# Patient Record
Sex: FEMALE | Race: BLACK OR AFRICAN AMERICAN | Employment: FULL TIME | URBAN - METROPOLITAN AREA
[De-identification: names, ages, dates, MRNs, and addresses within clinical notes are randomized per-mention and may not be internally consistent; named-entity substitution may affect disease eponyms.]

---

## 2017-01-21 ENCOUNTER — HOSPITAL ENCOUNTER (EMERGENCY)
Age: 52
Discharge: HOME | End: 2017-01-21
Attending: EMERGENCY MEDICINE
Payer: COMMERCIAL

## 2017-01-21 VITALS
WEIGHT: 285 LBS | HEART RATE: 81 BPM | OXYGEN SATURATION: 98 % | DIASTOLIC BLOOD PRESSURE: 85 MMHG | TEMPERATURE: 98.9 F | BODY MASS INDEX: 43.19 KG/M2 | HEIGHT: 68 IN | RESPIRATION RATE: 17 BRPM | SYSTOLIC BLOOD PRESSURE: 136 MMHG

## 2017-01-21 DIAGNOSIS — J01.90 ACUTE SINUSITIS, RECURRENCE NOT SPECIFIED, UNSPECIFIED LOCATION: Primary | ICD-10-CM

## 2017-01-21 DIAGNOSIS — J02.9 ACUTE PHARYNGITIS, UNSPECIFIED ETIOLOGY: ICD-10-CM

## 2017-01-21 PROCEDURE — 99282 EMERGENCY DEPT VISIT SF MDM: CPT

## 2017-01-21 RX ORDER — AMOXICILLIN 875 MG/1
875 TABLET, FILM COATED ORAL 2 TIMES DAILY
Qty: 20 TAB | Refills: 0 | Status: SHIPPED | OUTPATIENT
Start: 2017-01-21 | End: 2017-01-31

## 2017-01-21 NOTE — DISCHARGE INSTRUCTIONS
Sinusitis: Care Instructions  Your Care Instructions    Sinusitis is an infection of the lining of the sinus cavities in your head. Sinusitis often follows a cold. It causes pain and pressure in your head and face. In most cases, sinusitis gets better on its own in 1 to 2 weeks. But some mild symptoms may last for several weeks. Sometimes antibiotics are needed. Follow-up care is a key part of your treatment and safety. Be sure to make and go to all appointments, and call your doctor if you are having problems. It's also a good idea to know your test results and keep a list of the medicines you take. How can you care for yourself at home? · Take an over-the-counter pain medicine, such as acetaminophen (Tylenol), ibuprofen (Advil, Motrin), or naproxen (Aleve). Read and follow all instructions on the label. · If the doctor prescribed antibiotics, take them as directed. Do not stop taking them just because you feel better. You need to take the full course of antibiotics. · Be careful when taking over-the-counter cold or flu medicines and Tylenol at the same time. Many of these medicines have acetaminophen, which is Tylenol. Read the labels to make sure that you are not taking more than the recommended dose. Too much acetaminophen (Tylenol) can be harmful. · Breathe warm, moist air from a steamy shower, a hot bath, or a sink filled with hot water. Avoid cold, dry air. Using a humidifier in your home may help. Follow the directions for cleaning the machine. · Use saline (saltwater) nasal washes to help keep your nasal passages open and wash out mucus and bacteria. You can buy saline nose drops at a grocery store or drugstore. Or you can make your own at home by adding 1 teaspoon of salt and 1 teaspoon of baking soda to 2 cups of distilled water. If you make your own, fill a bulb syringe with the solution, insert the tip into your nostril, and squeeze gently. Verneice Root your nose.   · Put a hot, wet towel or a warm gel pack on your face 3 or 4 times a day for 5 to 10 minutes each time. · Try a decongestant nasal spray like oxymetazoline (Afrin). Do not use it for more than 3 days in a row. Using it for more than 3 days can make your congestion worse. When should you call for help? Call your doctor now or seek immediate medical care if:  · You have new or worse swelling or redness in your face or around your eyes. · You have a new or higher fever. Watch closely for changes in your health, and be sure to contact your doctor if:  · You have new or worse facial pain. · The mucus from your nose becomes thicker (like pus) or has new blood in it. · You are not getting better as expected. Where can you learn more? Go to http://gem-james.info/. Enter T096 in the search box to learn more about \"Sinusitis: Care Instructions. \"  Current as of: July 29, 2016  Content Version: 11.1  © 7768-9241 [employer]. Care instructions adapted under license by [employer] (which disclaims liability or warranty for this information). If you have questions about a medical condition or this instruction, always ask your healthcare professional. [place] [address] any warranty or liability for your use of this information. Sore Throat: Care Instructions  Your Care Instructions    Infection by bacteria or a virus causes most sore throats. Cigarette smoke, dry air, air pollution, allergies, and yelling can also cause a sore throat. Sore throats can be painful and annoying. Fortunately, most sore throats go away on their own. If you have a bacterial infection, your doctor may prescribe antibiotics. Follow-up care is a key part of your treatment and safety. Be sure to make and go to all appointments, and call your doctor if you are having problems. It's also a good idea to know your test results and keep a list of the medicines you take.   How can you care for yourself at home?  · If your doctor prescribed antibiotics, take them as directed. Do not stop taking them just because you feel better. You need to take the full course of antibiotics. · Gargle with warm salt water once an hour to help reduce swelling and relieve discomfort. Use 1 teaspoon of salt mixed in 1 cup of warm water. · Take an over-the-counter pain medicine, such as acetaminophen (Tylenol), ibuprofen (Advil, Motrin), or naproxen (Aleve). Read and follow all instructions on the label. · Be careful when taking over-the-counter cold or flu medicines and Tylenol at the same time. Many of these medicines have acetaminophen, which is Tylenol. Read the labels to make sure that you are not taking more than the recommended dose. Too much acetaminophen (Tylenol) can be harmful. · Drink plenty of fluids. Fluids may help soothe an irritated throat. Hot fluids, such as tea or soup, may help decrease throat pain. · Use over-the-counter throat lozenges to soothe pain. Regular cough drops or hard candy may also help. These should not be given to young children because of the risk of choking. · Do not smoke or allow others to smoke around you. If you need help quitting, talk to your doctor about stop-smoking programs and medicines. These can increase your chances of quitting for good. · Use a vaporizer or humidifier to add moisture to your bedroom. Follow the directions for cleaning the machine. When should you call for help? Call your doctor now or seek immediate medical care if:  · You have new or worse trouble swallowing. · Your sore throat gets much worse on one side. Watch closely for changes in your health, and be sure to contact your doctor if you do not get better as expected. Where can you learn more? Go to http://gem-james.info/. Enter [id number] [id number] in the search box to learn more about \"Sore Throat: Care Instructions. \"  Current as of: July 29, 2016  Content Version: 11.1  © 0513-8788 [employer]. Care instructions adapted under license by [employer] (which disclaims liability or warranty for this information). If you have questions about a medical condition or this instruction, always ask your healthcare professional. [address] any warranty or liability for your use of this information.

## 2017-01-21 NOTE — ED NOTES
Pt given discharge instructions and prescription, verbalized understanding.   Pt ambulatory out of ER with steady gait

## 2017-01-21 NOTE — ED PROVIDER NOTES
HPI Comments: 45 yo F with hx of HTN, hypercholesterolemia, T2DM presents ambulatory to the ER with R sided facial pain, tenderness and burning to R side of throat, pressure sensation to R sinus region, subjective fever. Onset Wednesday. Past Medical History:    HTN (hypertension)                              4/6/2010      LAP-BAND surgery status                         3/2006          Comment:band dislodged 5/2006 and not yet replaced      Pure hypercholesterolemia                       4/30/2010     Type II or unspecified type diabetes mellitus * 6/4/2011        Comment:not currently on medication    Social History    Marital status:             Spouse name:                       Years of education:                 Number of children:               Occupational History    None on file    Social History Main Topics    Smoking status: Former Smoker                                                                Packs/day: 1.50      Years: 12.00          Types: Cigarettes       Quit date: 2/2/1995    Smokeless status: Never Used                        Alcohol use: No              Drug use: No              Sexual activity: Not on file          Other Topics            Concern    None on file    Social History Narrative    None on file        Patient is a 46 y.o. female presenting with facial pain. Facial Pain    Pertinent negatives include no vomiting and no weakness.         Past Medical History:   Diagnosis Date    HTN (hypertension) 4/6/2010    LAP-BAND surgery status 3/2006     band dislodged 5/2006 and not yet replaced      Pure hypercholesterolemia 4/30/2010    Type II or unspecified type diabetes mellitus without mention of complication, not stated as uncontrolled 6/4/2011     not currently on medication       Past Surgical History:   Procedure Laterality Date    Hx breast reduction      Hx tubal ligation      Pr lap, place adjust gastr band           Family History:   Problem Relation Age of Onset    No Known Problems Mother     Hypertension Father     No Known Problems Sister        Social History     Social History    Marital status:      Spouse name: N/A    Number of children: N/A    Years of education: N/A     Occupational History    Not on file. Social History Main Topics    Smoking status: Former Smoker     Packs/day: 1.50     Years: 12.00     Types: Cigarettes     Quit date: 2/2/1995    Smokeless tobacco: Never Used    Alcohol use No    Drug use: No    Sexual activity: Not on file     Other Topics Concern    Not on file     Social History Narrative         ALLERGIES: Review of patient's allergies indicates no known allergies. Review of Systems   Constitutional: Positive for chills. Negative for activity change and appetite change. Subjective fever   HENT: Positive for ear pain, facial swelling, sinus pressure and sore throat. Negative for drooling, ear discharge and trouble swallowing. Eyes: Negative for discharge and redness. Respiratory: Negative for chest tightness, shortness of breath and wheezing. Cardiovascular: Negative for chest pain, palpitations and leg swelling. Gastrointestinal: Negative for abdominal pain, diarrhea, nausea, rectal pain and vomiting. Genitourinary: Negative for difficulty urinating, hematuria and urgency. Musculoskeletal: Negative for back pain, joint swelling, neck pain and neck stiffness. Skin: Negative for rash. Neurological: Negative for seizures, speech difficulty, weakness and headaches. Psychiatric/Behavioral: Negative for confusion. Vitals:    01/21/17 1607   BP: 136/85   Pulse: 81   Resp: 17   Temp: 98.9 °F (37.2 °C)   SpO2: 98%   Weight: 129.3 kg (285 lb)   Height: 5' 8\" (1.727 m)            Physical Exam   Constitutional: She is oriented to person, place, and time. She appears well-developed and well-nourished. No distress. HENT:   Head: Normocephalic and atraumatic.    Right Ear: Hearing normal. There is tenderness. Tympanic membrane is not injected and not perforated. Left Ear: Hearing normal. Tympanic membrane is not injected and not perforated. Nose: Mucosal edema present. Right sinus exhibits maxillary sinus tenderness. Mouth/Throat: Mucous membranes are normal. No oropharyngeal exudate. Tenderness to palpation of R TMJ region, R side of anterior neck.  + anterior cervical lymphadenopathy. Fluid noted behind B TMs L > R.  + oropharyngeal erythema without exudate. R maxillary sinus tenderness. Eyes: Conjunctivae and EOM are normal. Pupils are equal, round, and reactive to light. Neck: Normal range of motion. Neck supple. Cardiovascular: Normal rate, regular rhythm, normal heart sounds and intact distal pulses. Pulmonary/Chest: Effort normal and breath sounds normal. No accessory muscle usage. No tachypnea. She has no decreased breath sounds. She has no wheezes. B breath sounds CTA. No expiratory wheezes, crackles or rhonchi. No chest wall tenderness to palpation, no tachycardia, no evidence of hypoxia. Abdominal: Soft. Bowel sounds are normal. She exhibits no distension and no mass. There is no tenderness. There is no rigidity, no rebound and no guarding. Abdomen soft, nontender with active BS throughout. No rigidity, masses or guarding. No flank, CVA or suprapubic tenderness. Musculoskeletal: Normal range of motion. Lymphadenopathy:        Head (right side): Tonsillar adenopathy present. Head (left side): Tonsillar adenopathy present. She has cervical adenopathy. Neurological: She is alert and oriented to person, place, and time. She has normal strength. She displays no atrophy. No cranial nerve deficit or sensory deficit. She exhibits normal muscle tone. Coordination and gait normal. GCS eye subscore is 4. GCS verbal subscore is 5. GCS motor subscore is 6. Skin: Skin is warm and dry. Psychiatric: She has a normal mood and affect.  Her behavior is normal. Judgment and thought content normal.   Nursing note and vitals reviewed. MDM  Number of Diagnoses or Management Options  Diagnosis management comments: 52yo F with a hx of HTN, presents ambulatory to the ED with R sided anterior cervical tenderness, ST, R sinus pressure, subjective fever,  +anterior cervical lymphadenopathy.  + fluid behind B TMs. Onset Wednesday with worsening sx. Will treat with antibiotics due to lymphadenopathy, worsening sx and objective sx. Discussed supportive care at home. Discussed with pt the need to FU with PCP, return to ED for worsening sx which were discussed with pt prior to discharge.       Patient Progress  Patient progress: stable    ED Course       Procedures

## 2017-01-21 NOTE — ED TRIAGE NOTES
TRIAGE NOTE: Pt arrives from home with complaint of right sided facial pain that started several days ago. Reports pain now radiating into her right ear and neck. Also reports anterior headache that started since being in the ED. Also reports sore throat that started last night.

## 2017-02-03 ENCOUNTER — OFFICE VISIT (OUTPATIENT)
Dept: INTERNAL MEDICINE CLINIC | Age: 52
End: 2017-02-03

## 2017-02-03 VITALS
RESPIRATION RATE: 19 BRPM | HEART RATE: 93 BPM | OXYGEN SATURATION: 100 % | DIASTOLIC BLOOD PRESSURE: 84 MMHG | BODY MASS INDEX: 42.8 KG/M2 | WEIGHT: 282.4 LBS | HEIGHT: 68 IN | TEMPERATURE: 98 F | SYSTOLIC BLOOD PRESSURE: 135 MMHG

## 2017-02-03 DIAGNOSIS — J01.90 SUBACUTE SINUSITIS, UNSPECIFIED LOCATION: Primary | ICD-10-CM

## 2017-02-03 DIAGNOSIS — M76.62 TENDONITIS, ACHILLES, LEFT: ICD-10-CM

## 2017-02-03 RX ORDER — NAPROXEN 500 MG/1
500 TABLET ORAL 2 TIMES DAILY WITH MEALS
Qty: 60 TAB | Refills: 2 | Status: SHIPPED | OUTPATIENT
Start: 2017-02-03 | End: 2018-06-06 | Stop reason: SDUPTHER

## 2017-02-03 RX ORDER — FLUCONAZOLE 150 MG/1
TABLET ORAL
Qty: 2 TAB | Refills: 0 | Status: SHIPPED | OUTPATIENT
Start: 2017-02-03 | End: 2017-09-08

## 2017-02-03 RX ORDER — AMOXICILLIN AND CLAVULANATE POTASSIUM 875; 125 MG/1; MG/1
1 TABLET, FILM COATED ORAL EVERY 12 HOURS
Qty: 14 TAB | Refills: 0 | Status: SHIPPED | OUTPATIENT
Start: 2017-02-03 | End: 2017-09-08 | Stop reason: ALTCHOICE

## 2017-02-03 NOTE — PROGRESS NOTES
Reviewed record in preparation for visit and have obtained necessary documentation. Identified pt with two pt identifiers(name and ). Coordination of Care Questionnaire:  :     1) Have you been to an emergency room, urgent care clinic since your last visit? yes 16   Hospitalized since your last visit? no             2) Have you seen or consulted any other health care providers outside of [address] [place] [address] since your last visit? no  (Include any pap smears or colon screenings in this section.)      Patient is accompanied by mother I have received verbal consent from Jamshid Ramos to discuss any/all medical information while they are present in the room.

## 2017-02-03 NOTE — MR AVS SNAPSHOT
Visit Information Date & Time Provider Department Dept. Phone Encounter #  
 2/3/2017  3:15 PM Panchito Christensen, [address] [facility] [phone] [id number] Follow-up Instructions Return if symptoms worsen or fail to improve. Upcoming Health Maintenance Date Due Hepatitis C Screening 1965 FOOT EXAM Q1 9/6/1975 MICROALBUMIN Q1 9/6/1975 EYE EXAM RETINAL OR DILATED Q1 9/6/1975 Pneumococcal 19-64 Medium Risk (1 of 1 - PPSV23) 9/6/1984 DTaP/Tdap/Td series (1 - Tdap) 9/6/1986 BREAST CANCER SCRN MAMMOGRAM 9/6/2015 FOBT Q 1 YEAR AGE 50-75 9/6/2015 HEMOGLOBIN A1C Q6M 12/27/2016 PAP AKA CERVICAL CYTOLOGY 1/14/2017 LIPID PANEL Q1 6/27/2017 Allergies as of 2/3/2017  Review Complete On: 2/3/2017 By: Mari Mueller No Known Allergies Current Immunizations  Reviewed on 2/2/2011 Name Date Influenza Vaccine Whole 10/4/2010 Not reviewed this visit You Were Diagnosed With   
  
 Codes Comments Subacute sinusitis, unspecified location    -  Primary ICD-10-CM: J01.90 ICD-9-CM: 461.9 Tendonitis, Achilles, left     ICD-10-CM: M76.62 
ICD-9-CM: 726.71 Vitals BP Pulse Temp Resp Height(growth percentile) Weight(growth percentile) 135/84 (BP 1 Location: Left arm, BP Patient Position: Sitting) 93 98 °F (36.7 °C) (Oral) 19 5' 8\" (1.727 m) 282 lb 6.4 oz (128.1 kg) SpO2 BMI OB Status Smoking Status 100% 42.94 kg/m2 Ablation Former Smoker BMI and BSA Data Body Mass Index Body Surface Area 42.94 kg/m 2 2.48 m 2 Preferred Pharmacy Pharmacy Name Phone [address] [facility], [address] [facility] [phone] Your Updated Medication List  
  
   
This list is accurate as of: 2/3/17  4:27 PM.  Always use your most recent med list.  
  
  
  
  
 amoxicillin-clavulanate 875-125 mg per tablet Commonly known as:  AUGMENTIN  
 Take 1 Tab by mouth every twelve (12) hours. fluconazole 150 mg tablet Commonly known as:  DIFLUCAN Take one every other day for 2 doses. hydroCHLOROthiazide 25 mg tablet Commonly known as:  HYDRODIURIL  
TAKE ONE TABLET BY MOUTH ONCE DAILY losartan 50 mg tablet Commonly known as:  COZAAR  
TAKE 1 TABLET BY MOUTH ONCE EVERY DAY  
  
 naproxen 500 mg tablet Commonly known as:  NAPROSYN Take 1 Tab by mouth two (2) times daily (with meals). As needed for pain  
  
 topiramate 50 mg tablet Commonly known as:  TOPAMAX Take one tablet at bedtime. Prescriptions Sent to Pharmacy Refills  
 naproxen (NAPROSYN) 500 mg tablet 2 Sig: Take 1 Tab by mouth two (2) times daily (with meals). As needed for pain  
 Class: Normal  
 Pharmacy: [facility] [address] [place] [address] Ph #: [phone] Route: Oral  
 amoxicillin-clavulanate (AUGMENTIN) 875-125 mg per tablet 0 Sig: Take 1 Tab by mouth every twelve (12) hours. Class: Normal  
 Pharmacy: [facility] [address] [place] [address] AT [address] [place] [address] Ph #: [phone] Route: Oral  
 fluconazole (DIFLUCAN) 150 mg tablet 0 Sig: Take one every other day for 2 doses. Class: Normal  
 Pharmacy: [facility] [address] [place] [address] AT [address] [place] [address] Ph #: [phone] We Performed the Following REFERRAL TO PODIATRY [REF90 Custom] Comments:  
 Left achilles tendonitis Follow-up Instructions Return if symptoms worsen or fail to improve. Referral Information Referral ID Referred By Referred To  
  
 [id number] TREVON [address] [place] [address], [address] Phone: [phone] Fax: [phone] Visits Status Start Date End Date 1 New Request 2/3/17 2/3/18 If your referral has a status of pending review or denied, additional information will be sent to support the outcome of this decision. Patient Instructions Insomnia: Care Instructions Your Care Instructions Insomnia is the inability to sleep well. It is a common problem for most people at some time. Insomnia may make it hard for you to get to sleep, stay asleep, or sleep as long as you need to. This can make you tired and grouchy during the day. It can also make you forgetful, less effective at work, and unhappy. Insomnia can be caused by conditions such as depression or anxiety. Pain can also affect your ability to sleep. When these problems are solved, the insomnia usually clears up. But sometimes bad sleep habits can cause insomnia. If insomnia is affecting your work or your enjoyment of life, you can take steps to improve your sleep. Follow-up care is a key part of your treatment and safety. Be sure to make and go to all appointments, and call your doctor if you are having problems. It's also a good idea to know your test results and keep a list of the medicines you take. How can you care for yourself at home? What to avoid · Do not have drinks with caffeine, such as coffee or black tea, for 8 hours before bed. · Do not smoke or use other types of tobacco near bedtime. Nicotine is a stimulant and can keep you awake. · Avoid drinking alcohol late in the evening, because it can cause you to wake in the middle of the night. · Do not eat a big meal close to bedtime. If you are hungry, eat a light snack. · Do not drink a lot of water close to bedtime, because the need to urinate may wake you up during the night. · Do not read or watch TV in bed. Use the bed only for sleeping and sexual activity. What to try · Go to bed at the same time every night, and wake up at the same time every morning. Do not take naps during the day. · Keep your bedroom quiet, dark, and cool. · Sleep on a comfortable pillow and mattress. · If watching the clock makes you anxious, turn it facing away from you so you cannot see the time. · If you worry when you lie down, start a worry book. Well before bedtime, write down your worries, and then set the book and your concerns aside. · Try meditation or other relaxation techniques before you go to bed. · If you cannot fall asleep, get up and go to another room until you feel sleepy. Do something relaxing. Repeat your bedtime routine before you go to bed again. · Make your house quiet and calm about an hour before bedtime. Turn down the lights, turn off the TV, log off the computer, and turn down the volume on music. This can help you relax after a busy day. When should you call for help? Watch closely for changes in your health, and be sure to contact your doctor if: 
· Your efforts to improve your sleep do not work. · Your insomnia gets worse. · You have been feeling down, depressed, or hopeless or have lost interest in things that you usually enjoy. Where can you learn more? Go to http://gem-james.info/. Enter P513 in the search box to learn more about \"Insomnia: Care Instructions. \" Current as of: July 26, 2016 Content Version: 11.1 © 4916-9130 Healthwise, Incorporated. Care instructions adapted under license by [employer] (which disclaims liability or warranty for this information). If you have questions about a medical condition or this instruction, always ask your healthcare professional. [place] [address] any warranty or liability for your use of this information. Try melatonin 5-10mg at bedtime or sleepy tea. Achilles Tendon: Exercises Your Care Instructions Here are some examples of exercises for your achilles tendon. Start each exercise slowly. Ease off the exercise if you start to have pain. Your doctor or physical therapist will tell you when you can start these exercises and which ones will work best for you. How to do the exercises Toe stretch 1. Sit in a chair, and extend your affected leg so that your heel is on the floor. 2. With your hand, reach down and pull your big toe up and back. Pull toward your ankle and away from the floor. 3. Hold the position for at least 15 to 30 seconds. 4. Repeat 2 to 4 times a session, several times a day. Calf-plantar fascia stretch 1. Sit with your legs extended and knees straight. 2. Place a towel around your foot just under the toes. 3. Hold each end of the towel in each hand, with your hands above your knees. 4. Pull back with the towel so that your foot stretches toward you. 5. Hold the position for at least 15 to 30 seconds. 6. Repeat 2 to 4 times a session, up to 5 sessions a day. Floor stretch 1. Stand about 2 feet from a wall, and place your hands on the wall at about shoulder height. Or you can stand behind a chair, placing your hands on the back of it for balance. 2. Step back with the leg you want to stretch. Keep the leg straight, and press your heel into the floor with your toe turned slightly in. 
3. Lean forward, and bend your other leg slightly. Feel the stretch in the Achilles tendon of your back leg. Hold for at least 15 to 30 seconds. 4. Repeat 2 to 4 times a session, up to 5 sessions a day. Stair stretch 1. Stand with the balls of both feet on the edge of a step or curb (or a medium-sized phone book). With at least one hand, hold onto something solid for balance, such as a banister or handrail. 2. Keeping your affected leg straight, slowly let that heel hang down off of the step or curb until you feel a stretch in the back of your calf and/or Achilles area. Some of your weight should still be on the other leg. 3. Hold this position for at least 15 to 30 seconds. 4. Repeat 2 to 4 times a session, up to 5 times a day or whenever your Achilles tendon starts to feel tight. This stretch can also be done with your knee slightly bent. Strength exercise This exercise will get you started on building strength after an Achilles tendon injury. Your doctor or physical therapist can help you move on to more challenging exercises as you heal and get stronger. 1. Stand on a step with your heel off the edge of the step. Hold on to a handrail or wall for balance. 2. Push up on your toes, then slowly count to 10 as you lower yourself back down until your heel is below the step. If it hurts to push up on your toes, try putting most of your weight on your other foot as you push up, or try using your arms to help you. If you can't do this exercise without causing pain, stop the exercise and talk to your doctor. 3. Repeat the exercise 8 to 12 times, half with the knee straight and half with the knee bent. Follow-up care is a key part of your treatment and safety. Be sure to make and go to all appointments, and call your doctor if you are having problems. It's also a good idea to know your test results and keep a list of the medicines you take. Where can you learn more? Go to http://gem-james.info/. Enter F734 in the search box to learn more about \"Achilles Tendon: Exercises. \" Current as of: May 23, 2016 Content Version: 11.1 © 4927-6554 Interactive Advisory Software. Care instructions adapted under license by [employer] (which disclaims liability or warranty for this information). If you have questions about a medical condition or this instruction, always ask your healthcare professional. [address] any warranty or liability for your use of this information. The wisdom of menopause,  Dr. Srini Yañez Introducing [facility]!    
 Iva Garsia introduces Fan[employer] patient portal. Now you can access parts of your medical record, email your doctor's office, and request medication refills online. 1. In your internet browser, go to [email or web address] 2. Click on the First Time User? Click Here link in the Sign In box. You will see the New Member Sign Up page. 3. Enter your Wiki-PR Access Code exactly as it appears below. You will not need to use this code after youve completed the sign-up process. If you do not sign up before the expiration date, you must request a new code. · Wiki-PR Access Code: [email or web address] Expires: 4/21/2017  5:07 PM 
 
4. Enter the last four digits of your Social Security Number (xxxx) and Date of Birth (mm/dd/yyyy) as indicated and click Submit. You will be taken to the next sign-up page. 5. Create a Wiki-PR ID. This will be your Wiki-PR login ID and cannot be changed, so think of one that is secure and easy to remember. 6. Create a Wiki-PR password. You can change your password at any time. 7. Enter your Password Reset Question and Answer. This can be used at a later time if you forget your password. 8. Enter your e-mail address. You will receive e-mail notification when new information is available in [address] 9. Click Sign Up. You can now view and download portions of your medical record. 10. Click the Download Summary menu link to download a portable copy of your medical information. If you have questions, please visit the Frequently Asked Questions section of the Wiki-PR website. Remember, Wiki-PR is NOT to be used for urgent needs. For medical emergencies, dial 911. Now available from your iPhone and Android! Please provide this summary of care documentation to your next provider. Your primary care clinician is listed as Ricardo Pierre. If you have any questions after today's visit, please call [phone].

## 2017-02-03 NOTE — PATIENT INSTRUCTIONS
Insomnia: Care Instructions  Your Care Instructions  Insomnia is the inability to sleep well. It is a common problem for most people at some time. Insomnia may make it hard for you to get to sleep, stay asleep, or sleep as long as you need to. This can make you tired and grouchy during the day. It can also make you forgetful, less effective at work, and unhappy. Insomnia can be caused by conditions such as depression or anxiety. Pain can also affect your ability to sleep. When these problems are solved, the insomnia usually clears up. But sometimes bad sleep habits can cause insomnia. If insomnia is affecting your work or your enjoyment of life, you can take steps to improve your sleep. Follow-up care is a key part of your treatment and safety. Be sure to make and go to all appointments, and call your doctor if you are having problems. It's also a good idea to know your test results and keep a list of the medicines you take. How can you care for yourself at home? What to avoid  · Do not have drinks with caffeine, such as coffee or black tea, for 8 hours before bed. · Do not smoke or use other types of tobacco near bedtime. Nicotine is a stimulant and can keep you awake. · Avoid drinking alcohol late in the evening, because it can cause you to wake in the middle of the night. · Do not eat a big meal close to bedtime. If you are hungry, eat a light snack. · Do not drink a lot of water close to bedtime, because the need to urinate may wake you up during the night. · Do not read or watch TV in bed. Use the bed only for sleeping and sexual activity. What to try  · Go to bed at the same time every night, and wake up at the same time every morning. Do not take naps during the day. · Keep your bedroom quiet, dark, and cool. · Sleep on a comfortable pillow and mattress. · If watching the clock makes you anxious, turn it facing away from you so you cannot see the time.   · If you worry when you lie down, start a worry book. Well before bedtime, write down your worries, and then set the book and your concerns aside. · Try meditation or other relaxation techniques before you go to bed. · If you cannot fall asleep, get up and go to another room until you feel sleepy. Do something relaxing. Repeat your bedtime routine before you go to bed again. · Make your house quiet and calm about an hour before bedtime. Turn down the lights, turn off the TV, log off the computer, and turn down the volume on music. This can help you relax after a busy day. When should you call for help? Watch closely for changes in your health, and be sure to contact your doctor if:  · Your efforts to improve your sleep do not work. · Your insomnia gets worse. · You have been feeling down, depressed, or hopeless or have lost interest in things that you usually enjoy. Where can you learn more? Go to http://gem-james.info/. Enter P513 in the search box to learn more about \"Insomnia: Care Instructions. \"  Current as of: July 26, 2016  Content Version: 11.1  © 1908-2922 [employer]. Care instructions adapted under license by [employer] (which disclaims liability or warranty for this information). If you have questions about a medical condition or this instruction, always ask your healthcare professional. [address] any warranty or liability for your use of this information. Try melatonin 5-10mg at bedtime or sleepy tea. Achilles Tendon: Exercises  Your Care Instructions  Here are some examples of exercises for your achilles tendon. Start each exercise slowly. Ease off the exercise if you start to have pain. Your doctor or physical therapist will tell you when you can start these exercises and which ones will work best for you. How to do the exercises  Toe stretch    1. Sit in a chair, and extend your affected leg so that your heel is on the floor.   2. With your hand, reach down and pull your big toe up and back. Pull toward your ankle and away from the floor. 3. Hold the position for at least 15 to 30 seconds. 4. Repeat 2 to 4 times a session, several times a day. Calf-plantar fascia stretch    1. Sit with your legs extended and knees straight. 2. Place a towel around your foot just under the toes. 3. Hold each end of the towel in each hand, with your hands above your knees. 4. Pull back with the towel so that your foot stretches toward you. 5. Hold the position for at least 15 to 30 seconds. 6. Repeat 2 to 4 times a session, up to 5 sessions a day. Floor stretch    1. Stand about 2 feet from a wall, and place your hands on the wall at about shoulder height. Or you can stand behind a chair, placing your hands on the back of it for balance. 2. Step back with the leg you want to stretch. Keep the leg straight, and press your heel into the floor with your toe turned slightly in.  3. Lean forward, and bend your other leg slightly. Feel the stretch in the Achilles tendon of your back leg. Hold for at least 15 to 30 seconds. 4. Repeat 2 to 4 times a session, up to 5 sessions a day. Stair stretch    1. Stand with the balls of both feet on the edge of a step or curb (or a medium-sized phone book). With at least one hand, hold onto something solid for balance, such as a banister or handrail. 2. Keeping your affected leg straight, slowly let that heel hang down off of the step or curb until you feel a stretch in the back of your calf and/or Achilles area. Some of your weight should still be on the other leg. 3. Hold this position for at least 15 to 30 seconds. 4. Repeat 2 to 4 times a session, up to 5 times a day or whenever your Achilles tendon starts to feel tight. This stretch can also be done with your knee slightly bent. Strength exercise    This exercise will get you started on building strength after an Achilles tendon injury.  Your doctor or physical therapist can help you move on to more challenging exercises as you heal and get stronger. 1. Stand on a step with your heel off the edge of the step. Hold on to a handrail or wall for balance. 2. Push up on your toes, then slowly count to 10 as you lower yourself back down until your heel is below the step. If it hurts to push up on your toes, try putting most of your weight on your other foot as you push up, or try using your arms to help you. If you can't do this exercise without causing pain, stop the exercise and talk to your doctor. 3. Repeat the exercise 8 to 12 times, half with the knee straight and half with the knee bent. Follow-up care is a key part of your treatment and safety. Be sure to make and go to all appointments, and call your doctor if you are having problems. It's also a good idea to know your test results and keep a list of the medicines you take. Where can you learn more? Go to http://gem-james.info/. Enter D491 in the search box to learn more about \"Achilles Tendon: Exercises. \"  Current as of: May 23, 2016  Content Version: 11.1  © 8010-6732 [employer], Incorporated. Care instructions adapted under license by [employer] (which disclaims liability or warranty for this information). If you have questions about a medical condition or this instruction, always ask your healthcare professional. [place] [address] any warranty or liability for your use of this information.       The wisdom of menopause,  Dr. Jimi Bowling

## 2017-02-03 NOTE — PROGRESS NOTES
Mercedes Esquivel is a 46 y.o. female who presents with mother. Had sinusitis, took augmentin for 10 days, done 3 days ago. Took ibuprofen for pain. Started to improve, but symptoms are not resolved. No mucinex or nasal sprays. A lot of throat clearing. Sinus pressure. No sinus or ear pain. Prior to antibiotic had sinus pain on right. Boyfriend reports that she snores. Left achilles tendon pain, no known injury. Past Medical History   Diagnosis Date    HTN (hypertension) 4/6/2010    LAP-BAND surgery status 3/2006     band dislodged 5/2006 and not yet replaced      Pure hypercholesterolemia 4/30/2010    Type II or unspecified type diabetes mellitus without mention of complication, not stated as uncontrolled 6/4/2011     not currently on medication         Review of Systems  Pertinent items are noted in HPI. Objective:     Visit Vitals    /84 (BP 1 Location: Left arm, BP Patient Position: Sitting)    Pulse 93    Temp 98 °F (36.7 °C) (Oral)    Resp 19    Ht 5' 8\" (1.727 m)    Wt 282 lb 6.4 oz (128.1 kg)    SpO2 100%    BMI 42.94 kg/m2     Gen: well appearing female  HEENT:   PERRL,normal conjunctiva. External ear and canals normal, TMs no opacification or erythema,  Swollen turbinates, sinus pain on palpation. OP no erythema, no exudates, MMM  Neck:  Supple. Thyroid normal size, nontender, without nodules. No masses or LAD  Resp:  No wheezing, no rhonchi, no rales. CV:  RRR, normal S1S2, no murmur. Extrem:  +2 pulses, no edema, warm distally, left achilles tendon tenderness on palpation. Assessment/Plan:       ICD-10-CM ICD-9-CM    1. Subacute sinusitis, unspecified location J01.90 461.9 amoxicillin-clavulanate (AUGMENTIN) 875-125 mg per tablet   2. Tendonitis, Achilles, left M76.62 726.71 REFERRAL TO PODIATRY   given foot exercises. Use NSAIDS prn. Follow-up Disposition:  Return if symptoms worsen or fail to improve.     Ellie Jewell MD

## 2017-04-13 ENCOUNTER — OFFICE VISIT (OUTPATIENT)
Dept: INTERNAL MEDICINE CLINIC | Age: 52
End: 2017-04-13

## 2017-04-13 VITALS
HEART RATE: 79 BPM | OXYGEN SATURATION: 99 % | BODY MASS INDEX: 43.04 KG/M2 | SYSTOLIC BLOOD PRESSURE: 164 MMHG | WEIGHT: 284 LBS | RESPIRATION RATE: 19 BRPM | DIASTOLIC BLOOD PRESSURE: 76 MMHG | HEIGHT: 68 IN | TEMPERATURE: 98.3 F

## 2017-04-13 DIAGNOSIS — M79.605 PAIN OF LEFT LOWER EXTREMITY: Primary | ICD-10-CM

## 2017-04-13 RX ORDER — CYCLOBENZAPRINE HCL 5 MG
5 TABLET ORAL
Qty: 30 TAB | Refills: 0 | Status: SHIPPED | OUTPATIENT
Start: 2017-04-13 | End: 2017-09-08

## 2017-04-13 NOTE — MR AVS SNAPSHOT
Visit Information Date & Time Provider Department Dept. Phone Encounter #  
 4/13/2017  3:45 PM Nick Maurer, [address] [phone] [id number] Follow-up Instructions Return if symptoms worsen or fail to improve. Upcoming Health Maintenance Date Due Hepatitis C Screening 1965 FOOT EXAM Q1 9/6/1975 MICROALBUMIN Q1 9/6/1975 EYE EXAM RETINAL OR DILATED Q1 9/6/1975 Pneumococcal 19-64 Medium Risk (1 of 1 - PPSV23) 9/6/1984 DTaP/Tdap/Td series (1 - Tdap) 9/6/1986 BREAST CANCER SCRN MAMMOGRAM 9/6/2015 FOBT Q 1 YEAR AGE 50-75 9/6/2015 HEMOGLOBIN A1C Q6M 12/27/2016 PAP AKA CERVICAL CYTOLOGY 1/14/2017 LIPID PANEL Q1 6/27/2017 Allergies as of 4/13/2017  Review Complete On: 4/13/2017 By: Rosemarie Mac No Known Allergies Current Immunizations  Reviewed on 2/2/2011 Name Date Influenza Vaccine Whole 10/4/2010 Not reviewed this visit You Were Diagnosed With   
  
 Codes Comments Pain of left lower extremity    -  Primary ICD-10-CM: M79.605 ICD-9-CM: 729.5 Vitals BP Pulse Temp Resp Height(growth percentile) Weight(growth percentile) 164/76 (BP 1 Location: Left arm, BP Patient Position: Sitting) 79 98.3 °F (36.8 °C) (Oral) 19 5' 8\" (1.727 m) 284 lb (128.8 kg) SpO2 BMI OB Status Smoking Status 99% 43.18 kg/m2 Ablation Former Smoker Vitals History BMI and BSA Data Body Mass Index Body Surface Area  
 43.18 kg/m 2 2.49 m 2 Preferred Pharmacy Pharmacy Name Phone Jero Gallego [facility], [address] [place] [address] [phone] Your Updated Medication List  
  
   
This list is accurate as of: 4/13/17  4:22 PM.  Always use your most recent med list.  
  
  
  
  
 amoxicillin-clavulanate 875-125 mg per tablet Commonly known as:  AUGMENTIN Take 1 Tab by mouth every twelve (12) hours. BIOTIN PO Take  by mouth. cyclobenzaprine 5 mg tablet Commonly known as:  FLEXERIL Take 1 Tab by mouth three (3) times daily as needed for Muscle Spasm(s). fluconazole 150 mg tablet Commonly known as:  DIFLUCAN Take one every other day for 2 doses. hydroCHLOROthiazide 25 mg tablet Commonly known as:  HYDRODIURIL  
TAKE ONE TABLET BY MOUTH ONCE DAILY losartan 50 mg tablet Commonly known as:  COZAAR  
TAKE 1 TABLET BY MOUTH ONCE EVERY DAY  
  
 naproxen 500 mg tablet Commonly known as:  NAPROSYN Take 1 Tab by mouth two (2) times daily (with meals). As needed for pain  
  
 topiramate 50 mg tablet Commonly known as:  TOPAMAX Take one tablet at bedtime. Prescriptions Sent to Pharmacy Refills  
 cyclobenzaprine (FLEXERIL) 5 mg tablet 0 Sig: Take 1 Tab by mouth three (3) times daily as needed for Muscle Spasm(s). Class: Normal  
 Pharmacy: [facility] [address] [place] [address] #: [phone] Route: Oral  
  
We Performed the Following METABOLIC PANEL, BASIC [32153 CPT(R)] Follow-up Instructions Return if symptoms worsen or fail to improve. To-Do List   
 04/14/2017 Imaging:  DUPLEX LOWER EXT VENOUS LEFT Introducing [facility]! Iliana Downing introduces BrightSun patient portal. Now you can access parts of your medical record, email your doctor's office, and request medication refills online. 1. In your internet browser, go to [email or web address] 2. Click on the First Time User? Click Here link in the Sign In box. You will see the New Member Sign Up page. 3. Enter your BrightSun Access Code exactly as it appears below. You will not need to use this code after youve completed the sign-up process. If you do not sign up before the expiration date, you must request a new code. · BrightSun Access Code: [email or web address] Expires: 4/21/2017  6:07 PM 
 
 4. Enter the last four digits of your Social Security Number (xxxx) and Date of Birth (mm/dd/yyyy) as indicated and click Submit. You will be taken to the next sign-up page. 5. Create a TabSys ID. This will be your TabSys login ID and cannot be changed, so think of one that is secure and easy to remember. 6. Create a TabSys password. You can change your password at any time. 7. Enter your Password Reset Question and Answer. This can be used at a later time if you forget your password. 8. Enter your e-mail address. You will receive e-mail notification when new information is available in [address] 9. Click Sign Up. You can now view and download portions of your medical record. 10. Click the Download Summary menu link to download a portable copy of your medical information. If you have questions, please visit the Frequently Asked Questions section of the TabSys website. Remember, TabSys is NOT to be used for urgent needs. For medical emergencies, dial 911. Now available from your iPhone and Android! Please provide this summary of care documentation to your next provider. Your primary care clinician is listed as Yakov Saldivar. If you have any questions after today's visit, please call [phone].

## 2017-04-13 NOTE — PROGRESS NOTES
HISTORY OF PRESENT ILLNESS  Jolly Fam is a 46 y.o. female. HPI     C/o left leg pain and some swelling x3d  Occurred after standing in her kitchen but had been exercsing 2 times per day x 3weeks prior  No injury per pt  Felt like a severe zakiya horse left calf to left lower thigh  Pain has eased up last 1-2 days but still painful    Patient Active Problem List    Diagnosis Date Noted    Obesity 06/29/2016    Morbid obesity with BMI of 40.0-44.9, adult ([facility] [address] 02/09/2016    Menorrhagia 01/13/2014    Leiomyoma 01/13/2014    Pure hypercholesterolemia 04/30/2010    HTN (hypertension) 04/06/2010    LAP-BAND surgery status 04/06/2010     Current Outpatient Prescriptions   Medication Sig Dispense Refill    BIOTIN PO Take  by mouth.  cyclobenzaprine (FLEXERIL) 5 mg tablet Take 1 Tab by mouth three (3) times daily as needed for Muscle Spasm(s). 30 Tab 0    losartan (COZAAR) 50 mg tablet TAKE 1 TABLET BY MOUTH ONCE EVERY DAY 30 Tab 3    naproxen (NAPROSYN) 500 mg tablet Take 1 Tab by mouth two (2) times daily (with meals). As needed for pain 60 Tab 2    hydrochlorothiazide (HYDRODIURIL) 25 mg tablet TAKE ONE TABLET BY MOUTH ONCE DAILY 90 Tab 3    topiramate (TOPAMAX) 50 mg tablet Take one tablet at bedtime. 30 Tab 3    amoxicillin-clavulanate (AUGMENTIN) 875-125 mg per tablet Take 1 Tab by mouth every twelve (12) hours. 14 Tab 0    fluconazole (DIFLUCAN) 150 mg tablet Take one every other day for 2 doses. 2 Tab 0     No Known Allergies   Lab Results  Component Value Date/Time   GFR est  06/27/2016 08:59 AM   GFR est non- 06/27/2016 08:59 AM   Creatinine 0.64 06/27/2016 08:59 AM   BUN 17 06/27/2016 08:59 AM   Sodium 146 06/27/2016 08:59 AM   Potassium 4.0 06/27/2016 08:59 AM   Chloride 104 06/27/2016 08:59 AM   CO2 25 06/27/2016 08:59 AM         ROS    Physical Exam   Constitutional: She appears well-developed and well-nourished.    Appears stated age   Cardiovascular: Normal rate, regular rhythm and normal heart sounds. Exam reveals no gallop and no friction rub. No murmur heard. Pulmonary/Chest: Effort normal and breath sounds normal. No respiratory distress. She has no wheezes. Abdominal: Soft. Bowel sounds are normal.   Musculoskeletal: She exhibits edema. Mild left leg edema and TTP left calf area, homans is negative   Neurological: She is alert. Psychiatric: She has a normal mood and affect. Nursing note and vitals reviewed. ASSESSMENT and PLAN  Katie Jarvis was seen today for leg pain. Diagnoses and all orders for this visit:    Pain of left lower extremity  -     METABOLIC PANEL, BASIC  -     DUPLEX LOWER EXT VENOUS LEFT; Future   C/w cramp/spasm   check lytes,heat paj, flexeril, dopplers  Other orders  -     cyclobenzaprine (FLEXERIL) 5 mg tablet; Take 1 Tab by mouth three (3) times daily as needed for Muscle Spasm(s). Follow-up Disposition:  Return if symptoms worsen or fail to improve.

## 2017-04-14 LAB
BUN SERPL-MCNC: 12 MG/DL (ref 6–24)
BUN/CREAT SERPL: 20 (ref 9–23)
CALCIUM SERPL-MCNC: 9.4 MG/DL (ref 8.7–10.2)
CHLORIDE SERPL-SCNC: 99 MMOL/L (ref 96–106)
CO2 SERPL-SCNC: 25 MMOL/L (ref 18–29)
CREAT SERPL-MCNC: 0.59 MG/DL (ref 0.57–1)
GLUCOSE SERPL-MCNC: 86 MG/DL (ref 65–99)
POTASSIUM SERPL-SCNC: 3.7 MMOL/L (ref 3.5–5.2)
SODIUM SERPL-SCNC: 140 MMOL/L (ref 134–144)

## 2017-05-23 ENCOUNTER — TELEPHONE (OUTPATIENT)
Dept: INTERNAL MEDICINE CLINIC | Age: 52
End: 2017-05-23

## 2017-05-23 NOTE — TELEPHONE ENCOUNTER
Spoke with patient. States achilles is swollen and painful and exercises are not helping. Pain is getting worse. Back of heel is burning which is causing her to limp. She is asking what the next step is since this is not going away. Please advise.

## 2017-05-23 NOTE — TELEPHONE ENCOUNTER
Pt states her achilles tendon is swollen and bothering her again. Does she need PT or to see a specialist?  Please call.

## 2017-05-25 NOTE — TELEPHONE ENCOUNTER
Spoke with patient and informed Dr. Carleen Watters has not seen her for this. Needs appointment and possible xrays. Scheduled appointment for 5/26/17 with Dr. Carleen Watters. Patient satisfied.

## 2017-05-25 NOTE — TELEPHONE ENCOUNTER
MD Yenny Walker, LPN        Caller: Unspecified (2 days ago,  9:16 AM)                     I have not seen her for this.  She saw Dr. Milena Vargas in April and had an ultrasound to rule out DVT but no xrays. Needs appt.

## 2017-07-21 RX ORDER — LOSARTAN POTASSIUM 50 MG/1
TABLET ORAL
Qty: 30 TAB | Refills: 0 | Status: SHIPPED | OUTPATIENT
Start: 2017-07-21 | End: 2017-09-08 | Stop reason: SDUPTHER

## 2017-09-08 ENCOUNTER — OFFICE VISIT (OUTPATIENT)
Dept: INTERNAL MEDICINE CLINIC | Age: 52
End: 2017-09-08

## 2017-09-08 VITALS
WEIGHT: 289.8 LBS | DIASTOLIC BLOOD PRESSURE: 81 MMHG | BODY MASS INDEX: 43.92 KG/M2 | RESPIRATION RATE: 18 BRPM | SYSTOLIC BLOOD PRESSURE: 136 MMHG | TEMPERATURE: 97.5 F | HEART RATE: 78 BPM | HEIGHT: 68 IN | OXYGEN SATURATION: 97 %

## 2017-09-08 DIAGNOSIS — E55.9 VITAMIN D DEFICIENCY: ICD-10-CM

## 2017-09-08 DIAGNOSIS — G43.909 MIGRAINE WITHOUT STATUS MIGRAINOSUS, NOT INTRACTABLE, UNSPECIFIED MIGRAINE TYPE: ICD-10-CM

## 2017-09-08 DIAGNOSIS — I10 ESSENTIAL HYPERTENSION: ICD-10-CM

## 2017-09-08 DIAGNOSIS — E11.9 CONTROLLED TYPE 2 DIABETES MELLITUS WITHOUT COMPLICATION, WITHOUT LONG-TERM CURRENT USE OF INSULIN (HCC): Primary | ICD-10-CM

## 2017-09-08 DIAGNOSIS — Z12.11 SCREENING FOR COLON CANCER: ICD-10-CM

## 2017-09-08 DIAGNOSIS — Z11.59 NEED FOR HEPATITIS C SCREENING TEST: ICD-10-CM

## 2017-09-08 RX ORDER — TOPIRAMATE 50 MG/1
TABLET, FILM COATED ORAL
Qty: 30 TAB | Refills: 11 | Status: SHIPPED | OUTPATIENT
Start: 2017-09-08 | End: 2018-05-11

## 2017-09-08 RX ORDER — LOSARTAN POTASSIUM 50 MG/1
TABLET ORAL
Qty: 30 TAB | Refills: 11 | Status: SHIPPED | OUTPATIENT
Start: 2017-09-08 | End: 2018-10-10 | Stop reason: SDUPTHER

## 2017-09-08 NOTE — MR AVS SNAPSHOT
Visit Information Date & Time Provider Department Dept. Phone Encounter #  
 9/8/2017 11:00 AM Ajit Baker, [address] [place] [address],[address] [phone] [id number] Follow-up Instructions Return in about 6 months (around 3/8/2018) for follow up pending labs and 6 months. Esha Loose Upcoming Health Maintenance Date Due Hepatitis C Screening 1965 FOOT EXAM Q1 9/6/1975 MICROALBUMIN Q1 9/6/1975 EYE EXAM RETINAL OR DILATED Q1 9/6/1975 Pneumococcal 19-64 Medium Risk (1 of 1 - PPSV23) 9/6/1984 DTaP/Tdap/Td series (1 - Tdap) 9/6/1986 BREAST CANCER SCRN MAMMOGRAM 9/6/2015 FOBT Q 1 YEAR AGE 50-75 9/6/2015 HEMOGLOBIN A1C Q6M 12/27/2016 PAP AKA CERVICAL CYTOLOGY 1/14/2017 LIPID PANEL Q1 6/27/2017 INFLUENZA AGE 9 TO ADULT 8/1/2017 Allergies as of 9/8/2017  Review Complete On: 9/8/2017 By: Ajit Baker MD  
 No Known Allergies Current Immunizations  Reviewed on 2/2/2011 Name Date Influenza Vaccine Whole 10/4/2010 Not reviewed this visit You Were Diagnosed With   
  
 Codes Comments Controlled type 2 diabetes mellitus without complication, without long-term current use of insulin ([facility]    -  Primary ICD-10-CM: E11.9 ICD-9-CM: 250.00 Essential hypertension     ICD-10-CM: I10 
ICD-9-CM: 401.9 Migraine without status migrainosus, not intractable, unspecified migraine type     ICD-10-CM: G43.909 ICD-9-CM: 346.90 Need for hepatitis C screening test     ICD-10-CM: Z11.59 
ICD-9-CM: V73.89 Vitamin D deficiency     ICD-10-CM: E55.9 ICD-9-CM: 268.9 Screening for colon cancer     ICD-10-CM: Z12.11 ICD-9-CM: V76.51 Vitals BP Pulse Temp Resp Height(growth percentile) Weight(growth percentile) 136/81 (BP 1 Location: Left arm, BP Patient Position: Sitting) 78 97.5 °F (36.4 °C) (Oral) 18 5' 8\" (1.727 m) 289 lb 12.8 oz (131.5 kg) SpO2 BMI OB Status Smoking Status 97% 44.06 kg/m2 Ablation Former Smoker BMI and BSA Data Body Mass Index Body Surface Area 44.06 kg/m 2 2.51 m 2 Preferred Pharmacy Pharmacy Name Phone [address] [phone] Your Updated Medication List  
  
   
This list is accurate as of: 9/8/17 12:04 PM.  Always use your most recent med list.  
  
  
  
  
 BIOTIN PO Take  by mouth. hydroCHLOROthiazide 25 mg tablet Commonly known as:  HYDRODIURIL  
TAKE ONE TABLET BY MOUTH ONCE DAILY losartan 50 mg tablet Commonly known as:  COZAAR  
TAKE 1 TABLET BY MOUTH EVERY DAY  
  
 naproxen 500 mg tablet Commonly known as:  NAPROSYN Take 1 Tab by mouth two (2) times daily (with meals). As needed for pain  
  
 topiramate 50 mg tablet Commonly known as:  TOPAMAX Take one tablet at bedtime. Prescriptions Sent to Pharmacy Refills  
 losartan (COZAAR) 50 mg tablet 11 Sig: TAKE 1 TABLET BY MOUTH EVERY DAY Class: Normal  
 Pharmacy: [facility] [address] [place] [address] Ph #: [phone]  
 topiramate (TOPAMAX) 50 mg tablet 11 Sig: Take one tablet at bedtime. Class: Normal  
 Pharmacy: [facility], [facility] Ph #: [phone] We Performed the Following REFERRAL TO GASTROENTEROLOGY [XUB62 Custom] Comments:  
 Colonoscopy. Follow-up Instructions Return in about 6 months (around 3/8/2018) for follow up pending labs and 6 months. .  
  
To-Do List   
 09/08/2017 Lab:  CBC W/O DIFF   
  
 09/08/2017 Lab:  HCV AB W/RFLX TO SUDHA   
  
 09/08/2017 Lab:  HEMOGLOBIN A1C W/O EAG   
  
 09/08/2017 Lab:  LIPID PANEL   
  
 09/08/2017 Lab:  METABOLIC PANEL, COMPREHENSIVE   
  
 09/08/2017   Lab:  URINALYSIS W/ RFLX MICROSCOPIC   
 09/08/2017 Lab:  VITAMIN D, 25 HYDROXY Referral Information Referral ID Referred By Referred To  
  
 [id number] [facility] [address] [place], [address] [facility] Visits Status Start Date End Date 1 New Request 9/8/17 9/8/18 If your referral has a status of pending review or denied, additional information will be sent to support the outcome of this decision. Patient Instructions RECOMMEND [phone] CALORIE DIET. TRACK CALORIE INTAKE WITH MY FITNESS PAL ROSALIE. PROTEIN AT EVERY MEAL. REDUCE INTAKE OF STARCHY CARBS, LIKE BREAD, RICE, PASTA, AND POTATOES. MAKE CARBS 1/4 OF YOUR PLATE. DRINK CALORIE FREE BEVERAGES ONLY. TRY GRAZING DIET, 3 SMALL MEALS AND 2 SNACKS. INCREASE CARDIOVASCULAR EXERCISE, MINIMUM 3 DAYS A WEEK, 30 MINUTES OF CARDIO. Introducing [facility]! [employer] introduces oNoise patient portal. Now you can access parts of your medical record, email your doctor's office, and request medication refills online. 1. In your internet browser, go to [email or web address] 2. Click on the First Time User? Click Here link in the Sign In box. You will see the New Member Sign Up page. 3. Enter your oNoise Access Code exactly as it appears below. You will not need to use this code after youve completed the sign-up process. If you do not sign up before the expiration date, you must request a new code. · oNoise Access Code: [email or web address] Expires: 12/7/2017 12:04 PM 
 
4. Enter the last four digits of your Social Security Number (xxxx) and Date of Birth (mm/dd/yyyy) as indicated and click Submit. You will be taken to the next sign-up page. 5. Create a Advanced Sports Logict ID. This will be your oNoise login ID and cannot be changed, so think of one that is secure and easy to remember. 6. Create a Advanced [employer] Logict password. You can change your password at any time. 7. Enter your Password Reset Question and Answer. This can be used at a later time if you forget your password. 8. Enter your e-mail address. You will receive e-mail notification when new information is available in [address] 9. Click Sign Up. You can now view and download portions of your medical record. 10. Click the Download Summary menu link to download a portable copy of your medical information. If you have questions, please visit the Frequently Asked Questions section of the Bosse Tools website. Remember, Bosse Tools is NOT to be used for urgent needs. For medical emergencies, dial 911. Now available from your iPhone and Android! Please provide this summary of care documentation to your next provider. Your primary care clinician is listed as Annabelle Pedraza. If you have any questions after today's visit, please call [phone].

## 2017-09-08 NOTE — PROGRESS NOTES
Chief Complaint   Patient presents with    Complete Physical     Pt nonfasting     Reviewed record In preparation for visit and have obtained necessary documentation    1. Have you been to the ER, urgent care clinic since your last visit? Hospitalized since your last visit? NO    2. Have you seen or consulted any other health care providers outside of the [address] [place] [address] since your last visit? Include any pap smears or colon screening. NO    Patient does not have advance directive on file and has received paperwork.

## 2017-09-08 NOTE — PATIENT INSTRUCTIONS
RECOMMEND [phone] CALORIE DIET. TRACK CALORIE INTAKE WITH MY FITNESS PAL ROSALIE. PROTEIN AT EVERY MEAL. REDUCE INTAKE OF STARCHY CARBS, LIKE BREAD, RICE, PASTA, AND POTATOES. MAKE CARBS 1/4 OF YOUR PLATE. DRINK CALORIE FREE BEVERAGES ONLY. TRY GRAZING DIET, 3 SMALL MEALS AND 2 SNACKS. INCREASE CARDIOVASCULAR EXERCISE, MINIMUM 3 DAYS A WEEK, 30 MINUTES OF CARDIO.

## 2017-09-08 NOTE — PROGRESS NOTES
Alison Mcguire is a 46 y.o. female Is here for a health maintenance exam.   Not fasting for labs today. Had left Achilles tendonitis, sees podiatry, had boot for 8 weeks and PT for 6 weeks. Was not able to be active. Weight increased a little. Trying to follow diabetic diet. Last HbA1C 6.6% in June 2016. Leg will swell at times. Using compression socks. Using naproxen only prn now. Off losartan for 3 days, ran out of rx. Taking HCTZ 25mg once a day. BP okay today. No dizziness, no headaches. Was on topamax for migraines, off for 3 weeks. Sees gyn, up to date on pap. Ablation. No DUB. Up to date on mammogram.  No prior colon cancer screening.       No Known Allergies     Social History     Social History    Marital status:      Spouse name: N/A    Number of children: N/A    Years of education: N/A     Social History Main Topics    Smoking status: Former Smoker     Packs/day: 1.50     Years: 12.00     Types: Cigarettes     Quit date: 2/2/1995    Smokeless tobacco: Never Used    Alcohol use No    Drug use: No    Sexual activity: Yes     Partners: Male     Birth control/ protection: None     Other Topics Concern    None     Social History Narrative        Past Medical History:   Diagnosis Date    HTN (hypertension) 4/6/2010    LAP-BAND surgery status 3/2006    band dislodged 5/2006 and not yet replaced      Pure hypercholesterolemia 4/30/2010    Type II or unspecified type diabetes mellitus without mention of complication, not stated as uncontrolled 6/4/2011    not currently on medication        Past Surgical History:   Procedure Laterality Date    HX BREAST REDUCTION      HX TUBAL LIGATION      LAP, PLACE ADJUST GASTR BAND         Family History   Problem Relation Age of Onset    No Known Problems Mother     Hypertension Father     No Known Problems Sister         Current Outpatient Prescriptions   Medication Sig Dispense Refill    topiramate (TOPAMAX) 50 mg tablet Take one tablet at bedtime. 30 Tab 11    hydrochlorothiazide (HYDRODIURIL) 25 mg tablet TAKE ONE TABLET BY MOUTH ONCE DAILY 90 Tab 3    losartan (COZAAR) 50 mg tablet TAKE 1 TABLET BY MOUTH EVERY DAY 30 Tab 11    BIOTIN PO Take  by mouth.  naproxen (NAPROSYN) 500 mg tablet Take 1 Tab by mouth two (2) times daily (with meals). As needed for pain 60 Tab 2          ROS:  General: negative for fevers, chills, anorexia, weight loss  Eyes:   negative for visual disturbance, irritation  ENT:   negative for tinnitus,sore throat,nasal congestion,ear pain, sinus pain  Resp:   negative for cough, hemoptysis, dyspnea,wheezing  CV:   negative for chest pain, palpitations, positive for lower extremity edema  GI:   negative for nausea, vomiting, diarrhea, abdominal pain,melena  Endo:               negative for polyuria,polydipsia,polyphagia,heat intolerance  :  negative for frequency, dysuria, hematuria, vaginal discharge  Skin:   negative for rash, pruritus  Heme:  negative for easy bruising, gum/nose bleeding  Musc:  negative for myalgias, arthralgias, back pain, muscle weakness, positive for left ankle pain  Neuro:  negative for headaches, dizziness, numbness, focal weakness  Psych:  negative for feelings of anxiety, depression, mood changes      Blood pressure 136/81, pulse 78, temperature 97.5 °F (36.4 °C), temperature source Oral, resp. rate 18, height 5' 8\" (1.727 m), weight 289 lb 12.8 oz (131.5 kg), SpO2 97 %. Body mass index is 44.06 kg/(m^2). General: Well, no acute distress  HEENT:   PERRL,normal conjunctiva. External ear and canals normal, TMs normal.  Hearing normal to voice. Nose without edema or discharge, with normal septum. Lips, teeth, gums normal.  Oropharynx: no erythema, no exudates, no lesions, normal tongue. NECK:  Supple. Thyroid normal size, nontender, without nodules. No carotid bruit. No masses or LAD  RESP:  No wheezing, no rhonchi, no rales. No chest wall tenderness.   CV: RRR, normal S1S2, no murmur. GI: soft, nontender, without masses. No hepatosplenomegaly. Extrem:  +2 pulses, no edema, warm distally  NEURO: alert, nonfocal  PSYCH: . Affect is alert and attentive. Assessment and Plan:      1. Controlled type 2 diabetes mellitus without complication, without long-term current use of insulin ([address]- Recommend compliance with diabetic diet. Work on weight reduction. Keep up on regular diabetic eye exams.    - METABOLIC PANEL, COMPREHENSIVE; Future  - CBC W/O DIFF; Future  - LIPID PANEL; Future  - URINALYSIS W/ RFLX MICROSCOPIC; Future  - HEMOGLOBIN A1C W/O EAG; Future    2. Essential hypertension- Recommend following a lower sodium diet and regular cardiovascular exercise. Blood pressure goal is less than 130/85 on average. Advised compliance with blood pressure medication and regular follow up. - losartan (COZAAR) 50 mg tablet; TAKE 1 TABLET BY MOUTH EVERY DAY  Dispense: 30 Tab; Refill: 11    3. Migraine without status migrainosus, not intractable, unspecified migraine type    - topiramate (TOPAMAX) 50 mg tablet; Take one tablet at bedtime. Dispense: 30 Tab; Refill: 11    4. Need for hepatitis C screening test    - HCV AB W/RFLX TO SUDHA; Future    5. Vitamin D deficiency    - VITAMIN D, 25 HYDROXY; Future    6. Screening for colon cancer    - REFERRAL TO GASTROENTEROLOGY    Follow-up Disposition:  Return in about 6 months (around 3/8/2018) for follow up pending labs and 6 months.  Luana Banuelos MD

## 2017-10-06 RX ORDER — HYDROCHLOROTHIAZIDE 25 MG/1
TABLET ORAL
Qty: 90 TAB | Refills: 3 | Status: SHIPPED | OUTPATIENT
Start: 2017-10-06 | End: 2018-10-08 | Stop reason: SDUPTHER

## 2017-10-16 LAB
25(OH)D3+25(OH)D2 SERPL-MCNC: 21.2 NG/ML (ref 30–100)
ALBUMIN SERPL-MCNC: 4.2 G/DL (ref 3.5–5.5)
ALBUMIN/GLOB SERPL: 1.4 {RATIO} (ref 1.2–2.2)
ALP SERPL-CCNC: 100 IU/L (ref 39–117)
ALT SERPL-CCNC: 26 IU/L (ref 0–32)
APPEARANCE UR: ABNORMAL
AST SERPL-CCNC: 23 IU/L (ref 0–40)
BILIRUB SERPL-MCNC: 0.3 MG/DL (ref 0–1.2)
BILIRUB UR QL STRIP: NEGATIVE
BUN SERPL-MCNC: 12 MG/DL (ref 6–24)
BUN/CREAT SERPL: 16 (ref 9–23)
CALCIUM SERPL-MCNC: 9.2 MG/DL (ref 8.7–10.2)
CHLORIDE SERPL-SCNC: 104 MMOL/L (ref 96–106)
CHOLEST SERPL-MCNC: 231 MG/DL (ref 100–199)
CO2 SERPL-SCNC: 25 MMOL/L (ref 18–29)
COLOR UR: YELLOW
CREAT SERPL-MCNC: 0.75 MG/DL (ref 0.57–1)
ERYTHROCYTE [DISTWIDTH] IN BLOOD BY AUTOMATED COUNT: 15.5 % (ref 12.3–15.4)
GLOBULIN SER CALC-MCNC: 2.9 G/DL (ref 1.5–4.5)
GLUCOSE SERPL-MCNC: 99 MG/DL (ref 65–99)
GLUCOSE UR QL: NEGATIVE
HBA1C MFR BLD: 6.2 % (ref 4.8–5.6)
HCT VFR BLD AUTO: 36.5 % (ref 34–46.6)
HCV AB S/CO SERPL IA: <0.1 S/CO RATIO (ref 0–0.9)
HCV AB SERPL QL IA: NORMAL
HDLC SERPL-MCNC: 61 MG/DL
HGB BLD-MCNC: 12.1 G/DL (ref 11.1–15.9)
HGB UR QL STRIP: NEGATIVE
KETONES UR QL STRIP: NEGATIVE
LDLC SERPL CALC-MCNC: 154 MG/DL (ref 0–99)
LEUKOCYTE ESTERASE UR QL STRIP: NEGATIVE
MCH RBC QN AUTO: 28.2 PG (ref 26.6–33)
MCHC RBC AUTO-ENTMCNC: 33.2 G/DL (ref 31.5–35.7)
MCV RBC AUTO: 85 FL (ref 79–97)
MICRO URNS: ABNORMAL
NITRITE UR QL STRIP: NEGATIVE
PH UR STRIP: 6 [PH] (ref 5–7.5)
PLATELET # BLD AUTO: 351 X10E3/UL (ref 150–379)
POTASSIUM SERPL-SCNC: 4.2 MMOL/L (ref 3.5–5.2)
PROT SERPL-MCNC: 7.1 G/DL (ref 6–8.5)
PROT UR QL STRIP: ABNORMAL
RBC # BLD AUTO: 4.29 X10E6/UL (ref 3.77–5.28)
SODIUM SERPL-SCNC: 145 MMOL/L (ref 134–144)
SP GR UR: >=1.03 (ref 1–1.03)
TRIGL SERPL-MCNC: 82 MG/DL (ref 0–149)
UROBILINOGEN UR STRIP-MCNC: 1 MG/DL (ref 0.2–1)
VLDLC SERPL CALC-MCNC: 16 MG/DL (ref 5–40)
WBC # BLD AUTO: 7.3 X10E3/UL (ref 3.4–10.8)

## 2017-10-22 NOTE — PROGRESS NOTES
Notify patient LDL elevated 154, goal is less than 100. Follow low fat diet. Diabetes controlled. Hba1C 6.2%. Follow diabetic diet. Vitamin d low at 21, goal is greater than 30, recommend 2,000 iu vitamin d3 once a day over the counter. No change in medications. Other labs fine. Follow up in 6 months.

## 2017-10-23 ENCOUNTER — TELEPHONE (OUTPATIENT)
Dept: INTERNAL MEDICINE CLINIC | Age: 52
End: 2017-10-23

## 2017-10-24 NOTE — PROGRESS NOTES
Called patient. Two patient identifiers verified. Notified of results and recommendations per Dr. Uri Goss. Patient verbalized understanding and states no questions at this time.

## 2017-10-24 NOTE — TELEPHONE ENCOUNTER
Called patient. Two patient identifiers verified. Notified of results and recommendations per Dr. Ramez Fuentes. Patient verbalized understanding and states no questions at this time.

## 2018-01-08 ENCOUNTER — OFFICE VISIT (OUTPATIENT)
Dept: INTERNAL MEDICINE CLINIC | Age: 53
End: 2018-01-08

## 2018-01-08 ENCOUNTER — TELEPHONE (OUTPATIENT)
Dept: INTERNAL MEDICINE CLINIC | Age: 53
End: 2018-01-08

## 2018-01-08 VITALS
HEART RATE: 89 BPM | RESPIRATION RATE: 18 BRPM | HEIGHT: 68 IN | SYSTOLIC BLOOD PRESSURE: 175 MMHG | BODY MASS INDEX: 44.41 KG/M2 | WEIGHT: 293 LBS | OXYGEN SATURATION: 99 % | TEMPERATURE: 98.1 F | DIASTOLIC BLOOD PRESSURE: 95 MMHG

## 2018-01-08 DIAGNOSIS — J20.9 ACUTE BRONCHITIS, UNSPECIFIED ORGANISM: Primary | ICD-10-CM

## 2018-01-08 RX ORDER — HYDROCODONE POLISTIREX AND CHLORPHENIRAMINE POLISTIREX 10; 8 MG/5ML; MG/5ML
1 SUSPENSION, EXTENDED RELEASE ORAL
Qty: 115 ML | Refills: 0 | Status: SHIPPED | OUTPATIENT
Start: 2018-01-08 | End: 2018-05-11

## 2018-01-08 RX ORDER — AZITHROMYCIN 250 MG/1
250 TABLET, FILM COATED ORAL SEE ADMIN INSTRUCTIONS
Qty: 7 TAB | Refills: 0 | Status: SHIPPED | OUTPATIENT
Start: 2018-01-08 | End: 2018-05-11

## 2018-01-08 NOTE — MR AVS SNAPSHOT
Visit Information Date & Time Provider Department Dept. Phone Encounter #  
 1/8/2018  2:30 PM Tai Toussaint, [address] [place] [address],[address] [phone] [id number] Follow-up Instructions Return if symptoms worsen or fail to improve. Upcoming Health Maintenance Date Due  
 FOOT EXAM Q1 9/6/1975 MICROALBUMIN Q1 9/6/1975 EYE EXAM RETINAL OR DILATED Q1 9/6/1975 Pneumococcal 19-64 Medium Risk (1 of 1 - PPSV23) 9/6/1984 DTaP/Tdap/Td series (1 - Tdap) 9/6/1986 BREAST CANCER SCRN MAMMOGRAM 9/6/2015 FOBT Q 1 YEAR AGE 50-75 9/6/2015 PAP AKA CERVICAL CYTOLOGY 1/14/2017 HEMOGLOBIN A1C Q6M 4/14/2018 LIPID PANEL Q1 10/14/2018 Allergies as of 1/8/2018  Review Complete On: 1/8/2018 By: Tai Toussaint MD  
 No Known Allergies Current Immunizations  Reviewed on 2/2/2011 Name Date Influenza Vaccine Whole 10/4/2010 Not reviewed this visit You Were Diagnosed With   
  
 Codes Comments Acute bronchitis, unspecified organism    -  Primary ICD-10-CM: J20.9 ICD-9-CM: 466.0 Vitals BP Pulse Temp Resp Height(growth percentile) Weight(growth percentile) (!) 175/95 (BP 1 Location: Right arm, BP Patient Position: Sitting) 89 98.1 °F (36.7 °C) (Oral) 18 5' 8\" (1.727 m) 296 lb (134.3 kg) SpO2 BMI OB Status Smoking Status 99% 45.01 kg/m2 Ablation Former Smoker BMI and BSA Data Body Mass Index Body Surface Area 45.01 kg/m 2 2.54 m 2 Preferred Pharmacy Pharmacy Name Phone [address] [facility], [address] [facility] [phone] Your Updated Medication List  
  
   
This list is accurate as of: 1/8/18  2:48 PM.  Always use your most recent med list.  
  
  
  
  
 azithromycin 250 mg tablet Commonly known as:  Jack Santos Take 1 Tab by mouth See Admin Instructions for 6 doses.  Take 2 tabs on day one followed by 1 tab daily for a total of 5 days. BIOTIN PO Take  by mouth. chlorpheniramine-HYDROcodone 10-8 mg/5 mL suspension Commonly known as:  Shaaron Linear Take 5 mL by mouth every twelve (12) hours as needed for Cough. Max Daily Amount: 10 mL.  
  
 hydroCHLOROthiazide 25 mg tablet Commonly known as:  HYDRODIURIL  
TAKE ONE TABLET BY MOUTH ONCE DAILY losartan 50 mg tablet Commonly known as:  COZAAR  
TAKE 1 TABLET BY MOUTH EVERY DAY  
  
 naproxen 500 mg tablet Commonly known as:  NAPROSYN Take 1 Tab by mouth two (2) times daily (with meals). As needed for pain  
  
 topiramate 50 mg tablet Commonly known as:  TOPAMAX Take one tablet at bedtime. Prescriptions Printed Refills  
 chlorpheniramine-HYDROcodone (TUSSIONEX) 10-8 mg/5 mL suspension 0 Sig: Take 5 mL by mouth every twelve (12) hours as needed for Cough. Max Daily Amount: 10 mL. Class: Print Route: Oral  
  
Prescriptions Sent to Pharmacy Refills  
 azithromycin (ZITHROMAX) 250 mg tablet 0 Sig: Take 1 Tab by mouth See Admin Instructions for 6 doses. Take 2 tabs on day one followed by 1 tab daily for a total of 5 days. Class: Normal  
 Pharmacy: [facility] [address] [facility], [address] [place] [address] RD AT [address] [facility] #: [phone] Route: Oral  
  
Follow-up Instructions Return if symptoms worsen or fail to improve. Patient Instructions Take probiotic with antibiotic RePhresh OTC Introducing [facility]! [employer] introduces [employer] patient portal. Now you can access parts of your medical record, email your doctor's office, and request medication refills online. 1. In your internet browser, go to [email or web address] 2. Click on the First Time User? Click Here link in the Sign In box. You will see the New Member Sign Up page. 3. Enter your [facility]Â Network Access Code exactly as it appears below. You will not need to use this code after youve completed the sign-up process. If you do not sign up before the expiration date, you must request a new code. · [facility]Â [employer] Access Code: [email or web address] Expires: 4/8/2018  2:48 PM 
 
4. Enter the last four digits of your Social Security Number (xxxx) and Date of Birth (mm/dd/yyyy) as indicated and click Submit. You will be taken to the next sign-up page. 5. Create a StreetÂ [facility]Â Network ID. This will be your StreetÂ [facility]Â Network login ID and cannot be changed, so think of one that is secure and easy to remember. 6. Create a [facility]Â Network password. You can change your password at any time. 7. Enter your Password Reset Question and Answer. This can be used at a later time if you forget your password. 8. Enter your e-mail address. You will receive e-mail notification when new information is available in [address] 9. Click Sign Up. You can now view and download portions of your medical record. 10. Click the Download Summary menu link to download a portable copy of your medical information. If you have questions, please visit the Frequently Asked Questions section of the [employer] [employer]Â Network website. Remember, [facility]Â Network is NOT to be used for urgent needs. For medical emergencies, dial 911. Now available from your iPhone and Android! Please provide this summary of care documentation to your next provider. Your primary care clinician is listed as Arlene Guo. If you have any questions after today's visit, please call [phone].

## 2018-01-08 NOTE — PROGRESS NOTES
Chief Complaint   Patient presents with    Cold Symptoms     x1 week     1. Have you been to the ER, urgent care clinic since your last visit? Hospitalized since your last visit? No    2. Have you seen or consulted any other health care providers outside of the [address] [place] [address] since your last visit? Include any pap smears or colon screening.  No

## 2018-01-08 NOTE — TELEPHONE ENCOUNTER
John//Carol needs a call back to get clarification received for patient that was seen for Acute visit today. Please call.  Thank you

## 2018-01-08 NOTE — LETTER
NOTIFICATION RETURN TO WORK / SCHOOL 
 
1/8/2018 2:54 PM 
 
Ms. Chuyita Bell [address] [place] [phone] To Whom It May Concern: 
 
Chuyita Bell is currently under the care of [facility]. She will return to work/school on: 01/10/2017 If there are questions or concerns please have the patient contact our office. Sincerely, Oseas Knight MD

## 2018-01-08 NOTE — PROGRESS NOTES
CC:   Chief Complaint   Patient presents with    Cold Symptoms     x1 week         HPI:    Cori Correa is a 46 y.o. female with a history of diabetes diet-controlled hypertension who complains of URI symptoms for 7 days. Symptoms are getting worst, patient is having pronounced cough \" leading to leaking urine\", feels exhausted. Bringing up yellow mucous. Nasal discharge with blood.     Also reports body aches  Taking cough drops    Reports compliance with BP meds - BP elevated today     Past Medical History:   Diagnosis Date    HTN (hypertension) 4/6/2010    LAP-BAND surgery status 3/2006    band dislodged 5/2006 and not yet replaced      Pure hypercholesterolemia 4/30/2010    Type II or unspecified type diabetes mellitus without mention of complication, not stated as uncontrolled 6/4/2011    not currently on medication     shoulder      Review of Systems    Constitutional: negative for fevers, chills, anorexia and weight loss  Eyes:   negative for visual disturbance and irritation  ENT:   negative for tinnitus, positive for sore throat and nasal congestion  Negative for ear pain   Respiratory:  Positive  for cough, negative for hemoptysis, dyspnea,wheezing  CV:   negative for chest pain, palpitations, lower extremity edema  GI:   negative for nausea, vomiting, diarrhea, abdominal pain,melena  Genitourinary: negative for frequency, dysuria and hematuria, vaginal discharge, lesions  Musculoskel: negative for myalgias, arthralgias, back pain, muscle weakness, joint pain  Neurological:  negative for headaches, dizziness, focal weakness, numbness  Psych:         : negative for depression, anxiety     Objective:     Visit Vitals    BP (!) 175/95 (BP 1 Location: Right arm, BP Patient Position: Sitting)    Pulse 89    Temp 98.1 °F (36.7 °C) (Oral)    Resp 18    Ht 5' 8\" (1.727 m)    Wt 296 lb (134.3 kg)    SpO2 99%    BMI 45.01 kg/m2     Gen: Mildly ill appearing female  HEENT:   PERRL,normal conjunctiva. External ear and canals normal, TMs no opacification or erythema,  Swollen nasal turbinates, no sinus pain on palpation,  OP no erythema, no exudates, MMM  Neck:  Supple. Thyroid normal size, nontender, without nodules. No masses or LAD  Resp:  No wheezing, no rhonchi, no rales. Having pronounced cough   CV:  RRR, normal S1S2, no murmur. GI: soft, nontender, without masses. No hepatosplenomegaly. Extrem:  +2 pulses, no edema, warm distally  Skin:  No rash, no lesions, no ulcers. Skin warm, normal turgor, without induration or nodules. Lab Results   Component Value Date/Time    WBC 7.3 10/14/2017 12:00 AM    HGB 12.1 10/14/2017 12:00 AM    HCT 36.5 10/14/2017 12:00 AM    PLATELET 626 92/38/8211 12:00 AM    MCV 85 10/14/2017 12:00 AM     Lab Results   Component Value Date/Time    Sodium 145 10/14/2017 12:00 AM    Potassium 4.2 10/14/2017 12:00 AM    Chloride 104 10/14/2017 12:00 AM    CO2 25 10/14/2017 12:00 AM    Anion gap 6 05/09/2015 02:22 PM    Glucose 99 10/14/2017 12:00 AM    BUN 12 10/14/2017 12:00 AM    Creatinine 0.75 10/14/2017 12:00 AM    BUN/Creatinine ratio 16 10/14/2017 12:00 AM    GFR est  10/14/2017 12:00 AM    GFR est non-AA 92 10/14/2017 12:00 AM    Calcium 9.2 10/14/2017 12:00 AM         Assessment/Plan:     1. Acute bronchitis, unspecified organism  - azithromycin (ZITHROMAX) 250 mg tablet; Take 1 Tab by mouth See Admin Instructions for 6 doses. Take 2 tabs on day one followed by 1 tab daily for a total of 5 days. Dispense: 7 Tab; Refill: 0  - chlorpheniramine-HYDROcodone (TUSSIONEX) 10-8 mg/5 mL suspension; Take 5 mL by mouth every twelve (12) hours as needed for Cough. Max Daily Amount: 10 mL. Dispense: 115 mL; Refill: 0      Follow-up Disposition:  Return if symptoms worsen or fail to improve.     Lillie Benjamin MD

## 2018-01-09 NOTE — TELEPHONE ENCOUNTER
Returned call to [place] and spoke with Victorino. Clarified instructions for Z pack. No further questions at this time.

## 2018-01-10 ENCOUNTER — TELEPHONE (OUTPATIENT)
Dept: INTERNAL MEDICINE CLINIC | Age: 53
End: 2018-01-10

## 2018-01-10 NOTE — TELEPHONE ENCOUNTER
Called patient. Two patient identifiers verified. Advised patient that new letter printed with return to work date of 1/11/18. Will have Dr. Ana Hamlin sign when she returns to office tomorrow morning. Patient satisfied.

## 2018-01-10 NOTE — TELEPHONE ENCOUNTER
Called patient. Two patient identifiers verified. Patient states coughing is a little better, but still occurring. Patient states she feels very dehydrated and had \"shakes\" yesterday. She is pushing fluids and continuing prescribed medications. Requesting an extension on her return to work note which originally stated return to work today, 1/10/18. Please advise.       Patient's e-mail is [email or web address]

## 2018-01-10 NOTE — TELEPHONE ENCOUNTER
Patient states she needs a call back to discuss getting an extension on her doctors note from her Acute visit with Dr. Ana Paz on Monday as patient still has not bee able to return to work. Please call to advise.  Thank you

## 2018-02-26 ENCOUNTER — TELEPHONE (OUTPATIENT)
Dept: INTERNAL MEDICINE CLINIC | Age: 53
End: 2018-02-26

## 2018-02-26 ENCOUNTER — OFFICE VISIT (OUTPATIENT)
Dept: INTERNAL MEDICINE CLINIC | Age: 53
End: 2018-02-26

## 2018-02-26 VITALS
HEART RATE: 80 BPM | RESPIRATION RATE: 14 BRPM | TEMPERATURE: 98.4 F | WEIGHT: 293 LBS | OXYGEN SATURATION: 99 % | SYSTOLIC BLOOD PRESSURE: 142 MMHG | HEIGHT: 68 IN | DIASTOLIC BLOOD PRESSURE: 78 MMHG | BODY MASS INDEX: 44.41 KG/M2

## 2018-02-26 DIAGNOSIS — R07.89 OTHER CHEST PAIN: Primary | ICD-10-CM

## 2018-02-26 DIAGNOSIS — G47.00 INSOMNIA, UNSPECIFIED TYPE: ICD-10-CM

## 2018-02-26 DIAGNOSIS — R07.89 CHEST DISCOMFORT: Primary | ICD-10-CM

## 2018-02-26 DIAGNOSIS — F41.9 ANXIETY: ICD-10-CM

## 2018-02-26 DIAGNOSIS — R53.81 MALAISE: ICD-10-CM

## 2018-02-26 RX ORDER — TRAZODONE HYDROCHLORIDE 50 MG/1
50 TABLET ORAL
Qty: 30 TAB | Refills: 3 | Status: SHIPPED | OUTPATIENT
Start: 2018-02-26 | End: 2019-01-07

## 2018-02-26 NOTE — LETTER
NOTIFICATION RETURN TO WORK / SCHOOL 
 
2/26/2018 11:27 AM 
 
Ms. Layne Casillas [address] [phone] To Whom It May Concern: 
 
Layne Casillas is currently under the care of [facility]. She will return to work/school on: 3/1/18. If there are questions or concerns please have the patient contact our office.  
 
 
 
Sincerely, 
 
 
Jas Stringer MD

## 2018-02-26 NOTE — PROGRESS NOTES
SUBJECTIVE  Ms. Lamont Cr is a patient of Donaldo Duckworth MD and presents today acutely for     Chief Complaint   Patient presents with    Chest Pain     pt c.o pain ascross chest, pressure in chest, radiating to her back- and feeling weak/aches, and that she get sweaty x 4 day; pt notice she she has had shakes and her mouth watered ; pt states that she has worked 39 day straight and that she is not sleeping at night        Chest pressure has been coming and going, non exertional.  (Points to L upper quadrant abdomen). \"I went to reach for something this morning, and the pain took my breath away. \"  She lay down for a while, then noted pain in her upper back, and developed \"that watery taste in my mouth like when you are about to throw up. \"     Constant headaches. Started 3 days ago. Stress: Has been working 13 hour days, 39 days straight. She works for [employer]. Past Medical History:   Diagnosis Date    HTN (hypertension) 4/6/2010    LAP-BAND surgery status 3/2006    band dislodged 5/2006 and not yet replaced      Pure hypercholesterolemia 4/30/2010    Type II or unspecified type diabetes mellitus without mention of complication, not stated as uncontrolled 6/4/2011    not currently on medication       FH: family history includes Hypertension in her father; No Known Problems in her mother and sister. SH:  reports that she quit smoking about 23 years ago. Her smoking use included Cigarettes. She has a 18.00 pack-year smoking history. She has never used smokeless tobacco. She reports that she does not drink alcohol or use illicit drugs. ROS: Complete review of systems was performed and is otherwise unremarkable except as noted elsewhere.      OBJECTIVE  Visit Vitals    /78 (BP 1 Location: Left arm, BP Patient Position: Sitting)    Pulse 80    Temp 98.4 °F (36.9 °C) (Oral)    Resp 14    Ht 5' 8\" (1.727 m)    Wt 294 lb (133.4 kg)  SpO2 99%    BMI 44.7 kg/m2     Gen: Pleasant 46 y.o.  female in NAD.   HEENT: PERRLA. EOMI. OP moist and pink.  Neck: Supple.  No LAD.  HEART: RRR, No M/G/R.   LUNGS: CTAB No W/R.   ABDOMEN: S, NT, ND, BS+.   EXTREMITIES: Warm. No C/C/E. MUSCULOSKELETAL: Normal ROM, muscle strength 5/5 all groups. NEURO: Alert and oriented x 3.  Cranial nerves grossly intact.  No focal sensory or motor deficits noted. SKIN: Warm. Dry. No rashes or other lesions noted. ASSESSMENT / PLAN    ICD-10-CM ICD-9-CM    1. Chest discomfort: Sounds like mechanical pain/chest wall pain rather than angina. Still, she has risk factors so would be reasonable to explore stress testing at some point. R07.89 786.59 XR CHEST PA LAT      REFERRAL TO CARDIOLOGY   2. Insomnia, unspecified type G47.00 780.52 traZODone (DESYREL) 50 mg tablet   3. Anxiety F41.9 300.00 traZODone (DESYREL) 50 mg tablet   4. Malaise R53.81 780.79        She was advised to go ahead and get her labs (ordered in Sept), which will include a metabolic panel and CBC. Work note given: Stay home until 3/1/18. I have reviewed with the patient details of the assessment and plan and all questions were answered. Relevant patient education was performed. Follow-up Disposition:  Return in about 2 weeks (around 3/12/2018) for Follow up chest discomfort, labs--Dr. Ashley Judd.

## 2018-02-26 NOTE — MR AVS SNAPSHOT
[address] 
[phone] Patient: Jeny Rajan MRN:  [id number] Visit Information Date & Time Provider Department Dept. Phone Encounter #  
 2/26/2018 10:45 AM Donnie Hernandez, [address] [id number] Follow-up Instructions Return in about 2 weeks (around 3/12/2018) for Follow up chest discomfort, labs--Dr. Stephen Soto. Follow-up and Disposition History Upcoming Health Maintenance Date Due  
 FOOT EXAM Q1 9/6/1975 MICROALBUMIN Q1 9/6/1975 EYE EXAM RETINAL OR DILATED Q1 9/6/1975 Pneumococcal 19-64 Medium Risk (1 of 1 - PPSV23) 9/6/1984 DTaP/Tdap/Td series (1 - Tdap) 9/6/1986 BREAST CANCER SCRN MAMMOGRAM 9/6/2015 FOBT Q 1 YEAR AGE 50-75 9/6/2015 PAP AKA CERVICAL CYTOLOGY 1/14/2017 HEMOGLOBIN A1C Q6M 4/14/2018 LIPID PANEL Q1 10/14/2018 Allergies as of 2/26/2018  Review Complete On: 2/26/2018 By: Donnie Hernandez MD  
 No Known Allergies Current Immunizations  Reviewed on 2/2/2011 Name Date Influenza Vaccine Whole 10/4/2010 Not reviewed this visit You Were Diagnosed With   
  
 Codes Comments Chest discomfort    -  Primary ICD-10-CM: R07.89 ICD-9-CM: 786.59 Insomnia, unspecified type     ICD-10-CM: G47.00 ICD-9-CM: 780.52 Anxiety     ICD-10-CM: F41.9 ICD-9-CM: 300.00 Malaise     ICD-10-CM: R53.81 ICD-9-CM: 780.79 Vitals BP Pulse Temp Resp Height(growth percentile) Weight(growth percentile) 142/78 (BP 1 Location: Left arm, BP Patient Position: Sitting) 80 98.4 °F (36.9 °C) (Oral) 14 5' 8\" (1.727 m) 294 lb (133.4 kg) SpO2 BMI OB Status Smoking Status 99% 44.7 kg/m2 Ablation Former Smoker Vitals History BMI and BSA Data Body Mass Index Body Surface Area 44.7 kg/m 2 2.53 m 2 Preferred Pharmacy Pharmacy Name Phone [address] [facility] [phone] Your Updated Medication List  
  
   
This list is accurate as of 2/26/18 11:32 AM.  Always use your most recent med list.  
  
  
  
  
 azithromycin 250 mg tablet Commonly known as:  Laura Brown Take 1 Tab by mouth See Admin Instructions for 6 doses. Take 2 tabs on day one followed by 1 tab daily for a total of 5 days. BIOTIN PO Take  by mouth. chlorpheniramine-HYDROcodone 10-8 mg/5 mL suspension Commonly known as:  Dominique Celeste Take 5 mL by mouth every twelve (12) hours as needed for Cough. Max Daily Amount: 10 mL.  
  
 hydroCHLOROthiazide 25 mg tablet Commonly known as:  HYDRODIURIL  
TAKE ONE TABLET BY MOUTH ONCE DAILY losartan 50 mg tablet Commonly known as:  COZAAR  
TAKE 1 TABLET BY MOUTH EVERY DAY  
  
 naproxen 500 mg tablet Commonly known as:  NAPROSYN Take 1 Tab by mouth two (2) times daily (with meals). As needed for pain  
  
 topiramate 50 mg tablet Commonly known as:  TOPAMAX Take one tablet at bedtime. traZODone 50 mg tablet Commonly known as:  Krissy Mir Take 1 Tab by mouth nightly. Prescriptions Sent to Pharmacy Refills  
 traZODone (DESYREL) 50 mg tablet 3 Sig: Take 1 Tab by mouth nightly. Class: Normal  
 Pharmacy: [facility], [address] [place] [address] RD AT [address] [facility] #: [phone] Route: Oral  
  
We Performed the Following REFERRAL TO CARDIOLOGY [BRO24 Custom] Follow-up Instructions Return in about 2 weeks (around 3/12/2018) for Follow up chest discomfort, labs--Dr. Laura Fulton. To-Do List   
 02/26/2018 Imaging:  XR CHEST PA LAT Referral Information Referral ID Referred By Referred To  
  
 [id number] GEORGES Lazo MD   
   [address] [facility], [address] Phone: [phone] Fax: [phone] Visits Status Start Date End Date 1 New Request 2/26/18 2/26/19 If your referral has a status of pending review or denied, additional information will be sent to support the outcome of this decision. Patient Instructions Work-Life Balance: Care Instructions Your Care Instructions Do you ever feel like there is not enough time to do all of the things you have to do, and no time at all for the things you enjoy? If so, you are not alone. On average, people in the [place] have worked more and more hours each year since 1970. But in recent years, fewer people say they want to take on more at work, even if they would get promoted or get paid more money. More and more workers say they want time to spend with their families and to do things that are important to them. Do you ever feel: · That you always have more and more work to do at your job? · That too many people depend on you every day? · That you never have enough time for your family or friends? · That you never have time for hobbies or things you enjoy? · That each second of your day is scheduled? If you answered \"yes\" to any of these questions, take steps at work and at home to get your life into balance. Follow-up care is a key part of your treatment and safety. Be sure to make and go to all appointments, and call your doctor if you are having problems. How can you care for yourself at home? Manage your time · Focus on the important things. Taking on too much can wear you out. Look at how you spend your time, and redirect your focus. Learn to say \"no\" and let go of things that do not matter. · Set one small goal at a time. Use a day planner. Break large projects into smaller ones. · Ask for help. Let your children, your spouse, your coworkers, and other people in your life help you get things done. · Leave your job at the office. If you give up free time to get more work done, you may pay for it with stress. If your job offers a flexible work schedule, use it to fit your own work style. For instance, come in earlier to have a longer lunch break, or make time for a yoga class or workout during your workday. · Unplug. Do not let technology (such as your cell phone or the Internet) erase the line between your time and your employer's time. Lower job stress Job stress causes trouble at work and at home. At work, you may worry about things you have not had time to do at home. At home, you may worry about your job. This cycle upsets your work-life balance. Lowering your job stress can get your life back in balance. Job stress can be caused by: · Pressure and deadlines. · Heavy workloads or long hours. · Not being allowed to make decisions. · Health and safety hazards. · Feeling you may lose your job. · Unclear or changing job duties. · Too much responsibility. · Work that is very tiring or boring. Do any of these things bother you? Consider talking with your boss to change things. There are some things that you may not be able to control. But even a few small changes might help lower your stress. Take advantage of programs at work Businesses make money and are better off in other ways if their employees are healthy and happy. For this reason, many companies have programs to help balance work life and home life. These programs may include: · Flexible schedules and hours. · Time off for family reasons, education, or community service. · Being able to work from home. · Employee assistance programs to provide counseling. · Child-care programs. Check to see if your company has any of these or other programs that could help you. If not, consider talking to your boss about why work-life balance programs make good business sense.  Even if your company does not start an official program, you may be able to get flexible hours, time off, or the ability to do some work from home. Know when to quit If you are truly unhappy because of a stressful job, and if the suggestions here have not worked, it may be time to think about changing jobs or changing careers. But before you quit, take time to research your options. Where can you learn more? Go to http://gem-james.info/. Enter T601 in the search box to learn more about \"Work-Life Balance: Care Instructions. \" Current as of: July 26, 2016 Content Version: 11.4 © 3104-7363 [employer]. Care instructions adapted under license by [employer] (which disclaims liability or warranty for this information). If you have questions about a medical condition or this instruction, always ask your healthcare professional. [address] any warranty or liability for your use of this information. Introducing [facility]! Yana Humphrey introduces Ulmart patient portal. Now you can access parts of your medical record, email your doctor's office, and request medication refills online. 1. In your internet browser, go to [email or web address] [email or web address] Shop 2. Click on the First Time User? Click Here link in the Sign In box. You will see the New Member Sign Up page. 3. Enter your Ulmart Access Code exactly as it appears below. You will not need to use this code after youve completed the sign-up process. If you do not sign up before the expiration date, you must request a new code. · [employer]mart Access Code: [email or web address] Expires: 4/8/2018  2:48 PM 
 
4. Enter the last four digits of your Social Security Number (xxxx) and Date of Birth (mm/dd/yyyy) as indicated and click Submit. You will be taken to the next sign-up page. 5. Create a Ulmart ID.  This will be your Ulmart login ID and cannot be changed, so think of one that is secure and easy to remember. 6. Create a 3dim password. You can change your password at any time. 7. Enter your Password Reset Question and Answer. This can be used at a later time if you forget your password. 8. Enter your e-mail address. You will receive e-mail notification when new information is available in [address] 9. Click Sign Up. You can now view and download portions of your medical record. 10. Click the Download Summary menu link to download a portable copy of your medical information. If you have questions, please visit the Frequently Asked Questions section of the 3dim website. Remember, 3dim is NOT to be used for urgent needs. For medical emergencies, dial 911. Now available from your iPhone and Android! Please provide this summary of care documentation to your next provider. Your primary care clinician is listed as Nehal Astudillo. If you have any questions after today's visit, please call [phone].

## 2018-02-26 NOTE — PATIENT INSTRUCTIONS
Work-Life Balance: Care Instructions  Your Care Instructions    Do you ever feel like there is not enough time to do all of the things you have to do, and no time at all for the things you enjoy? If so, you are not alone. On average, people in the [place] have worked more and more hours each year since 1970. But in recent years, fewer people say they want to take on more at work, even if they would get promoted or get paid more money. More and more workers say they want time to spend with their families and to do things that are important to them. Do you ever feel:  · That you always have more and more work to do at your job? · That too many people depend on you every day? · That you never have enough time for your family or friends? · That you never have time for hobbies or things you enjoy? · That each second of your day is scheduled? If you answered \"yes\" to any of these questions, take steps at work and at home to get your life into balance. Follow-up care is a key part of your treatment and safety. Be sure to make and go to all appointments, and call your doctor if you are having problems. How can you care for yourself at home? Manage your time  · Focus on the important things. Taking on too much can wear you out. Look at how you spend your time, and redirect your focus. Learn to say \"no\" and let go of things that do not matter. · Set one small goal at a time. Use a day planner. Break large projects into smaller ones. · Ask for help. Let your children, your spouse, your coworkers, and other people in your life help you get things done. · Leave your job at the office. If you give up free time to get more work done, you may pay for it with stress. If your job offers a flexible work schedule, use it to fit your own work style. For instance, come in earlier to have a longer lunch break, or make time for a yoga class or workout during your workday. · Unplug.  Do not let technology (such as your cell phone or the Internet) erase the line between your time and your employer's time. Lower job stress  Job stress causes trouble at work and at home. At work, you may worry about things you have not had time to do at home. At home, you may worry about your job. This cycle upsets your work-life balance. Lowering your job stress can get your life back in balance. Job stress can be caused by:  · Pressure and deadlines. · Heavy workloads or long hours. · Not being allowed to make decisions. · Health and safety hazards. · Feeling you may lose your job. · Unclear or changing job duties. · Too much responsibility. · Work that is very tiring or boring. Do any of these things bother you? Consider talking with your boss to change things. There are some things that you may not be able to control. But even a few small changes might help lower your stress. Take advantage of programs at work  Businesses make money and are better off in other ways if their employees are healthy and happy. For this reason, many companies have programs to help balance work life and home life. These programs may include:  · Flexible schedules and hours. · Time off for family reasons, education, or community service. · Being able to work from home. · Employee assistance programs to provide counseling. · Child-care programs. Check to see if your company has any of these or other programs that could help you. If not, consider talking to your boss about why work-life balance programs make good business sense. Even if your company does not start an official program, you may be able to get flexible hours, time off, or the ability to do some work from home. Know when to quit  If you are truly unhappy because of a stressful job, and if the suggestions here have not worked, it may be time to think about changing jobs or changing careers. But before you quit, take time to research your options. Where can you learn more?   Go to http://gem-james.info/. Enter W171 in the search box to learn more about \"Work-Life Balance: Care Instructions. \"  Current as of: July 26, 2016  Content Version: 11.4  © 1902-6120 [employer]. Care instructions adapted under license by [employer] (which disclaims liability or warranty for this information). If you have questions about a medical condition or this instruction, always ask your healthcare professional. [address] any warranty or liability for your use of this information.

## 2018-02-26 NOTE — TELEPHONE ENCOUNTER
Patient returning a call from Gena Byers.  Contact is [phone]       Message received & copied from [facility]

## 2018-02-26 NOTE — PROGRESS NOTES
1. Have you been to the ER, urgent care clinic since your last visit? Hospitalized since your last visit?no    2. Have you seen or consulted any other health care providers outside of the [place] [address] since your last visit? Include any pap smears or colon screening.  no

## 2018-03-09 ENCOUNTER — TELEPHONE (OUTPATIENT)
Dept: INTERNAL MEDICINE CLINIC | Age: 53
End: 2018-03-09

## 2018-03-09 RX ORDER — FLUCONAZOLE 150 MG/1
150 TABLET ORAL EVERY OTHER DAY
Qty: 3 TAB | Refills: 0 | Status: SHIPPED | OUTPATIENT
Start: 2018-03-09 | End: 2018-03-12

## 2018-03-09 NOTE — TELEPHONE ENCOUNTER
Patient seen by Dr. Joseph Davenport on 1/8/18 states she needs a call back to get a prescription called in for a yeast infection from antibioitics prescribed at this visit. Please call to discuss.  Thank you

## 2018-03-09 NOTE — TELEPHONE ENCOUNTER
Patient was seen on 1/8/18 by Dr. Ana Hamlin for acute bronchitis. Treated with Z pack. Patient is requesting rx for fluconazole for yeast infection. Please advise.

## 2018-03-19 ENCOUNTER — OFFICE VISIT (OUTPATIENT)
Dept: CARDIOLOGY CLINIC | Age: 53
End: 2018-03-19

## 2018-03-19 VITALS
HEIGHT: 68 IN | WEIGHT: 290.4 LBS | DIASTOLIC BLOOD PRESSURE: 80 MMHG | HEART RATE: 88 BPM | RESPIRATION RATE: 20 BRPM | BODY MASS INDEX: 44.01 KG/M2 | OXYGEN SATURATION: 98 % | SYSTOLIC BLOOD PRESSURE: 130 MMHG

## 2018-03-19 DIAGNOSIS — E78.00 PURE HYPERCHOLESTEROLEMIA: ICD-10-CM

## 2018-03-19 DIAGNOSIS — E11.9 CONTROLLED TYPE 2 DIABETES MELLITUS WITHOUT COMPLICATION, WITHOUT LONG-TERM CURRENT USE OF INSULIN (HCC): ICD-10-CM

## 2018-03-19 DIAGNOSIS — I20.8 OTHER FORMS OF ANGINA PECTORIS (HCC): Primary | ICD-10-CM

## 2018-03-19 DIAGNOSIS — I10 ESSENTIAL HYPERTENSION: ICD-10-CM

## 2018-03-19 DIAGNOSIS — E66.01 MORBID OBESITY WITH BMI OF 40.0-44.9, ADULT (HCC): ICD-10-CM

## 2018-03-19 NOTE — PROGRESS NOTES
Subjective/HPI:     Steffany Maloney is a 46 y.o. female is here for new patient consultation. The patient reports mid sternal chest pressure, SOB with activity. More noticeable while climbing stairs. No edema, palpitations. Physically inactive due to work, knee injury. Has gained 60 lbs. No prior cardiac history quit smoking. Patient Active Problem List    Diagnosis Date Noted    Controlled type 2 diabetes mellitus without complication, without long-term current use of insulin ([facility] 09/08/2017    Obesity 06/29/2016    Morbid obesity with BMI of 40.0-44.9, adult ([facility] [address] 02/09/2016    Menorrhagia 01/13/2014    Leiomyoma 01/13/2014    Pure hypercholesterolemia 04/30/2010    HTN (hypertension) 04/06/2010    LAP-BAND surgery status 04/06/2010      Aurelio Clement MD  Past Medical History:   Diagnosis Date    HTN (hypertension) 4/6/2010    LAP-BAND surgery status 3/2006    band dislodged 5/2006 and not yet replaced      Pure hypercholesterolemia 4/30/2010    Type II or unspecified type diabetes mellitus without mention of complication, not stated as uncontrolled 6/4/2011    not currently on medication      Past Surgical History:   Procedure Laterality Date    HX BREAST REDUCTION      HX ORTHOPAEDIC  2015    torn meniscus, left    HX TUBAL LIGATION      LAP, PLACE ADJUST GASTR BAND       No Known Allergies   Family History   Problem Relation Age of Onset    No Known Problems Mother     Hypertension Father     No Known Problems Sister       Current Outpatient Prescriptions   Medication Sig    traZODone (DESYREL) 50 mg tablet Take 1 Tab by mouth nightly.  hydroCHLOROthiazide (HYDRODIURIL) 25 mg tablet TAKE ONE TABLET BY MOUTH ONCE DAILY    losartan (COZAAR) 50 mg tablet TAKE 1 TABLET BY MOUTH EVERY DAY    BIOTIN PO Take  by mouth.  azithromycin (ZITHROMAX) 250 mg tablet Take 1 Tab by mouth See Admin Instructions for 6 doses.  Take 2 tabs on day one followed by 1 tab daily for a total of 5 days.  chlorpheniramine-HYDROcodone (TUSSIONEX) 10-8 mg/5 mL suspension Take 5 mL by mouth every twelve (12) hours as needed for Cough. Max Daily Amount: 10 mL.  topiramate (TOPAMAX) 50 mg tablet Take one tablet at bedtime. (Patient not taking: Reported on 3/19/2018)    naproxen (NAPROSYN) 500 mg tablet Take 1 Tab by mouth two (2) times daily (with meals). As needed for pain (Patient not taking: Reported on 3/19/2018)     No current facility-administered medications for this visit. Vitals:    03/19/18 1040 03/19/18 1056   BP: 132/78 130/80   Pulse: 88    Resp: 20    SpO2: 98%    Weight: 290 lb 6.4 oz (131.7 kg)    Height: 5' 8\" (1.727 m)        I have reviewed the nurses notes, vitals, problem list, allergy list, medical history, family, social history and medications. Review of Symptoms:    General: Pt denies excessive weight gain or loss. Pt is able to conduct ADL's  HEENT: Denies blurred vision, headaches, epistaxis and difficulty swallowing. Respiratory: Denies shortness of breath, STAHL, wheezing or stridor. Cardiovascular: Denies precordial pain, palpitations, edema or PND  Gastrointestinal: Denies poor appetite, indigestion, abdominal pain or blood in stool  Musculoskeletal: Denies pain or swelling from muscles or joints  Neurologic: Denies tremor, paresthesias, or sensory motor disturbance  Skin: Denies rash, itching or texture change. Physical Exam:      General: Well developed, cooperative, alert in no acute distress, appears states age. HEENT: Supple, No carotid bruits, no JVD, trach is midline. PERRL, EOM intact  Heart:  Normal S1/S2 negative S3 or S4. Regular, no murmur, gallop or rub.   Respiratory: Clear bilaterally x 4, no wheezing or rales  Abdomen:   Soft, non-tender, no masses, bowel sounds are active.   Extremities:  No edema, normal cap refill, no cyanosis, atraumatic. Neuro: A&Ox3, speech clear, gait stable.    Skin: Skin color is normal. No rashes or lesions. Non diaphoretic  Vascular: 2+ pulses symmetric in all extremities    Cardiographics    ECG: sinus rythm       Assessment:     Assessment:        ICD-10-CM ICD-9-CM    1. Pure hypercholesterolemia E78.00 272.0 AMB POC EKG ROUTINE W/ 12 LEADS, INTER & REP   2. Essential hypertension I10 401.9    3. Controlled type 2 diabetes mellitus without complication, without long-term current use of insulin (HCC) E11.9 250.00    4. Morbid obesity with BMI of 40.0-44.9, adult ([facility] E66.01 278.01     Z68.41 V85.41    5. Other forms of angina pectoris (HCC) I20.8 413.9 STRESS TEST LEXISCAN/CARDIOLITE      2D ECHO COMPLETE ADULT (TTE) W OR WO CONTR     Orders Placed This Encounter    AMB POC EKG ROUTINE W/ 12 LEADS, INTER & REP     Order Specific Question:   Reason for Exam:     Answer:   Routine    LEXISCAN/CARDIOLITE, Clinic Performed     Standing Status:   Future     Standing Expiration Date:   9/19/2018     Order Specific Question:   Reason for Exam:     Answer:   chest pain    2D ECHO COMPLETE ADULT (TTE) W OR WO CONTR     Standing Status:   Future     Standing Expiration Date:   9/19/2018     Order Specific Question:   Reason for Exam:     Answer:   chest pain     Order Specific Question:   Contrast Enhancement (Bubble Study, Definity, Optison) may be used if criteria listed in established evidence-based protocol has been identified. Answer:   Yes       PLAN:    Patient presents with chest tightness. Risk factors include HTN, hyperlipidemia, borderline DM, Sedentary life style. Will evaluate for ischemia with pharmacologic stress test and structural heart with echo as noted. She is unable to walk due to knee injury.      Lexy Briceño MD

## 2018-03-19 NOTE — PROGRESS NOTES
1. Have you been to the ER, urgent care clinic since your last visit? Hospitalized since your last visit? No    2. Have you seen or consulted any other health care providers outside of the [address] [place] [address] since your last visit? Include any pap smears or colon screening.  No    Chief Complaint   Patient presents with   [facility]     new pt; referred by Dr. Seamus Sinha for chest pain    Chest Pain     pt c/o on and off pressure pain to L side of chest x 2 mos; pressure to middle of chest x 2 wks ago with pain radiating back     Leg Swelling     pt c/o mild swelling to L leg

## 2018-03-19 NOTE — MR AVS SNAPSHOT
[address] [facility] 
[phone] Patient: Ernestina Champagne MRN:  [id number] Visit Information Date & Time Provider Department Dept. Phone Encounter #  
 3/19/2018 11:00 AM Doug Perez MD [facility] [phone] [id number] Your Appointments 3/23/2018  3:30 PM  
ECHO CARDIOGRAMS 2D with [address]  [facility]) Appt Note: per Dr. Sandi Olivier $45cp  ekr [id number] [facility]  
[phone] [address] [facility] [address] [address]  
  
    
 3/29/2018  8:30 AM  
NUCLEAR MEDICINE with NUCLEAR, [facility]) Appt Note: 5'8, 290, 2day avis per Dr. Teixeira Artist [facility]  
[phone] [id number] [facility]  
  
    
 4/6/2018  1:00 PM  
ESTABLISHED PATIENT with Dogu Perez MD  
[facility]) Appt Note: per Dr. Sandi Olivier, test results  ekr [id number] [facility]  
[phone] [address] [facility] Upcoming Health Maintenance Date Due  
 FOOT EXAM Q1 9/6/1975 MICROALBUMIN Q1 9/6/1975 EYE EXAM RETINAL OR DILATED Q1 9/6/1975 Pneumococcal 19-64 Medium Risk (1 of 1 - PPSV23) 9/6/1984 DTaP/Tdap/Td series (1 - Tdap) 9/6/1986 BREAST CANCER SCRN MAMMOGRAM 9/6/2015 FOBT Q 1 YEAR AGE 50-75 9/6/2015 PAP AKA CERVICAL CYTOLOGY 1/14/2017 HEMOGLOBIN A1C Q6M 4/14/2018 LIPID PANEL Q1 10/14/2018 Allergies as of 3/19/2018  Review Complete On: 3/19/2018 By: Martin Espitia Babar No Known Allergies Current Immunizations  Reviewed on 2/2/2011 Name Date Influenza Vaccine Whole 10/4/2010 Not reviewed this visit You Were Diagnosed With   
  
 Codes Comments Pure hypercholesterolemia    -  Primary ICD-10-CM: E78.00 ICD-9-CM: 272.0 Essential hypertension     ICD-10-CM: I10 
ICD-9-CM: 401.9 Controlled type 2 diabetes mellitus without complication, without long-term current use of insulin ([address] [facility]     ICD-10-CM: E11.9 ICD-9-CM: 250.00 Morbid obesity with BMI of 40.0-44.9, adult (HCC)     ICD-10-CM: E66.01, Z68.41 
ICD-9-CM: 278.01, V85.41 Other forms of angina pectoris ([address] [facility]     ICD-10-CM: I20.8 ICD-9-CM: 413.9 Vitals BP Pulse Resp Height(growth percentile) Weight(growth percentile) SpO2  
 130/80 (BP 1 Location: Left arm, BP Patient Position: Sitting) 88 20 5' 8\" (1.727 m) 290 lb 6.4 oz (131.7 kg) 98% BMI OB Status Smoking Status 44.16 kg/m2 Ablation Former Smoker Vitals History BMI and BSA Data Body Mass Index Body Surface Area  
 44.16 kg/m 2 2.51 m 2 Preferred Pharmacy Pharmacy Name Phone [address], [address] [facility] [phone] Your Updated Medication List  
  
   
This list is accurate as of 3/19/18 11:27 AM.  Always use your most recent med list.  
  
  
  
  
 azithromycin 250 mg tablet Commonly known as:  Mario Flaming Take 1 Tab by mouth See Admin Instructions for 6 doses. Take 2 tabs on day one followed by 1 tab daily for a total of 5 days. BIOTIN PO Take  by mouth. chlorpheniramine-HYDROcodone 10-8 mg/5 mL suspension Commonly known as:  Judith Bang Take 5 mL by mouth every twelve (12) hours as needed for Cough. Max Daily Amount: 10 mL.  
  
 hydroCHLOROthiazide 25 mg tablet Commonly known as:  HYDRODIURIL  
TAKE ONE TABLET BY MOUTH ONCE DAILY losartan 50 mg tablet Commonly known as:  COZAAR  
TAKE 1 TABLET BY MOUTH EVERY DAY  
  
 naproxen 500 mg tablet Commonly known as:  NAPROSYN Take 1 Tab by mouth two (2) times daily (with meals). As needed for pain topiramate 50 mg tablet Commonly known as:  TOPAMAX Take one tablet at bedtime. traZODone 50 mg tablet Commonly known as:  [place] Take 1 Tab by mouth nightly. We Performed the Following AMB POC EKG ROUTINE W/ 12 LEADS, INTER & REP [63254 CPT(R)] To-Do List   
 Around 03/20/2018 ECHO:  2D ECHO COMPLETE ADULT (TTE) W OR WO CONTR Around 03/20/2018 ECG:  STRESS TEST LEXISCAN/CARDIOLITE Introducing [facility] & HEALTH SERVICES! [facility] introduces [employer] patient portal. Now you can access parts of your medical record, email your doctor's office, and request medication refills online. 1. In your internet browser, go to [email or web address] 2. Click on the First Time User? Click Here link in the Sign In box. You will see the New Member Sign Up page. 3. Enter your [employer] Access Code exactly as it appears below. You will not need to use this code after youve completed the sign-up process. If you do not sign up before the expiration date, you must request a new code. · [employer] Access Code: [email or web address] Expires: 4/8/2018  3:48 PM 
 
4. Enter the last four digits of your Social Security Number (xxxx) and Date of Birth (mm/dd/yyyy) as indicated and click Submit. You will be taken to the next sign-up page. 5. Create a [employer] ID. This will be your [employer] login ID and cannot be changed, so think of one that is secure and easy to remember. 6. Create a [employer] password. You can change your password at any time. 7. Enter your Password Reset Question and Answer. This can be used at a later time if you forget your password. 8. Enter your e-mail address. You will receive e-mail notification when new information is available in [address] 9. Click Sign Up. You can now view and download portions of your medical record. 10. Click the Download Summary menu link to download a portable copy of your medical information. If you have questions, please visit the Frequently Asked Questions section of the [employer] website. Remember, [employer] is NOT to be used for urgent needs. For medical emergencies, dial 911. Now available from your iPhone and Android! Please provide this summary of care documentation to your next provider. Your primary care clinician is listed as Nehal Astudillo. If you have any questions after today's visit, please call [phone].

## 2018-03-23 ENCOUNTER — CLINICAL SUPPORT (OUTPATIENT)
Dept: CARDIOLOGY CLINIC | Age: 53
End: 2018-03-23

## 2018-03-23 DIAGNOSIS — I20.8 OTHER FORMS OF ANGINA PECTORIS (HCC): ICD-10-CM

## 2018-03-29 ENCOUNTER — TELEPHONE (OUTPATIENT)
Dept: CARDIOLOGY CLINIC | Age: 53
End: 2018-03-29

## 2018-03-29 ENCOUNTER — CLINICAL SUPPORT (OUTPATIENT)
Dept: CARDIOLOGY CLINIC | Age: 53
End: 2018-03-29

## 2018-03-29 DIAGNOSIS — I20.8 OTHER FORMS OF ANGINA PECTORIS (HCC): Primary | ICD-10-CM

## 2018-03-30 ENCOUNTER — CLINICAL SUPPORT (OUTPATIENT)
Dept: CARDIOLOGY CLINIC | Age: 53
End: 2018-03-30

## 2018-03-30 DIAGNOSIS — I20.8 OTHER FORMS OF ANGINA PECTORIS (HCC): ICD-10-CM

## 2018-04-06 ENCOUNTER — OFFICE VISIT (OUTPATIENT)
Dept: CARDIOLOGY CLINIC | Age: 53
End: 2018-04-06

## 2018-04-06 VITALS
HEIGHT: 68 IN | RESPIRATION RATE: 18 BRPM | BODY MASS INDEX: 44.41 KG/M2 | HEART RATE: 88 BPM | SYSTOLIC BLOOD PRESSURE: 140 MMHG | OXYGEN SATURATION: 96 % | DIASTOLIC BLOOD PRESSURE: 90 MMHG | WEIGHT: 293 LBS

## 2018-04-06 DIAGNOSIS — E78.00 PURE HYPERCHOLESTEROLEMIA: ICD-10-CM

## 2018-04-06 DIAGNOSIS — I10 HYPERTENSION, UNSPECIFIED TYPE: Primary | ICD-10-CM

## 2018-04-06 DIAGNOSIS — E11.9 CONTROLLED TYPE 2 DIABETES MELLITUS WITHOUT COMPLICATION, WITHOUT LONG-TERM CURRENT USE OF INSULIN (HCC): ICD-10-CM

## 2018-04-06 DIAGNOSIS — E66.01 MORBID OBESITY WITH BMI OF 40.0-44.9, ADULT (HCC): ICD-10-CM

## 2018-04-06 NOTE — PROGRESS NOTES
1. Have you been to the ER, urgent care clinic since your last visit? Hospitalized since your last visit? No.    2. Have you seen or consulted any other health care providers outside of the [address] [place] [address] since your last visit? Include any pap smears or colon screening.      No.      Chief Complaint   Patient presents with    Results     here to discuss test results and review-pt denies any cardiac symptoms

## 2018-04-10 NOTE — PROGRESS NOTES
NAME:  Wilbert Sierra   :   1965   MRN:   [id number]   PCP:  Sharda Reza MD           Subjective: The patient is a 46y.o. year old female  who returns for a routine follow-up. Since the last visit, patient reports no change in exercise tolerance, chest pain, edema, medication intolerance, palpitations, shortness of breath, PND/orthopnea wheezing, sputum, syncope, dizziness or light headedness. Doing well. Past Medical History:   Diagnosis Date    HTN (hypertension) 2010    LAP-BAND surgery status 3/2006    band dislodged 2006 and not yet replaced      Pure hypercholesterolemia 2010    Type II or unspecified type diabetes mellitus without mention of complication, not stated as uncontrolled 2011    not currently on medication        ICD-10-CM ICD-9-CM    1. Hypertension, unspecified type I10 401.9    2. Pure hypercholesterolemia E78.00 272.0    3. Controlled type 2 diabetes mellitus without complication, without long-term current use of insulin (HCC) E11.9 250.00    4. Morbid obesity with BMI of 40.0-44.9, adult ([facility] E66.01 278.01     Z68.41 V85.41       Social History   Substance Use Topics    Smoking status: Former Smoker     Packs/day: 1.50     Years: 12.00     Types: Cigarettes     Quit date: 1995    Smokeless tobacco: Never Used    Alcohol use No      Family History   Problem Relation Age of Onset    No Known Problems Mother     Hypertension Father     No Known Problems Sister         Review of Systems  General: Pt denies excessive weight gain or loss. Pt is able to conduct ADL's  HEENT: Denies blurred vision, headaches, epistaxis and difficulty swallowing. Respiratory: Denies shortness of breath, STAHL, wheezing or stridor.   Cardiovascular: Denies precordial pain, palpitations, edema or PND  Gastrointestinal: Denies poor appetite, indigestion, abdominal pain or blood in stool  Musculoskeletal: Denies pain or swelling from muscles or joints  Neurologic: Denies tremor, paresthesias, or sensory motor disturbance  Skin: Denies rash, itching or texture change. Objective:       Vitals:    04/06/18 1301 04/06/18 1314   BP: 138/80 140/90   Pulse: 88    Resp: 18    SpO2: 96%    Weight: 299 lb 8 oz (135.9 kg)    Height: 5' 8\" (1.727 m)     Body mass index is 45.54 kg/(m^2). General PE  Mental Status - Alert. General Appearance - Not in acute distress. Chest and Lung Exam   Inspection: Accessory muscles - No use of accessory muscles in breathing. Auscultation:   Breath sounds: - Normal.    Cardiovascular   Inspection: Jugular vein - Bilateral - Inspection Normal.  Palpation/Percussion:   Apical Impulse: - Normal.  Auscultation: Rhythm - Regular. Heart Sounds - S1 WNL and S2 WNL. No S3 or S4. Murmurs & Other Heart Sounds: Auscultation of the heart reveals - No Murmurs. Peripheral Vascular   Upper Extremity: Inspection - Bilateral - No Cyanotic nailbeds or Digital clubbing. Lower Extremity:   Palpation: Edema - Bilateral - No edema. Data Review:     EKG -EKG: normal EKG, normal sinus rhythm, unchanged from previous tracings. Medications reviewed  Current Outpatient Prescriptions   Medication Sig    traZODone (DESYREL) 50 mg tablet Take 1 Tab by mouth nightly.  hydroCHLOROthiazide (HYDRODIURIL) 25 mg tablet TAKE ONE TABLET BY MOUTH ONCE DAILY    losartan (COZAAR) 50 mg tablet TAKE 1 TABLET BY MOUTH EVERY DAY    BIOTIN PO Take  by mouth.  naproxen (NAPROSYN) 500 mg tablet Take 1 Tab by mouth two (2) times daily (with meals). As needed for pain (Patient taking differently: Take 500 mg by mouth two (2) times daily (with meals). As needed for pain  Indications: prn)    azithromycin (ZITHROMAX) 250 mg tablet Take 1 Tab by mouth See Admin Instructions for 6 doses. Take 2 tabs on day one followed by 1 tab daily for a total of 5 days.     chlorpheniramine-HYDROcodone (TUSSIONEX) 10-8 mg/5 mL suspension Take 5 mL by mouth every twelve (12) hours as needed for Cough. Max Daily Amount: 10 mL.  topiramate (TOPAMAX) 50 mg tablet Take one tablet at bedtime. (Patient not taking: Reported on 3/19/2018)     No current facility-administered medications for this visit. Assessment:       ICD-10-CM ICD-9-CM    1. Hypertension, unspecified type I10 401.9    2. Pure hypercholesterolemia E78.00 272.0    3. Controlled type 2 diabetes mellitus without complication, without long-term current use of insulin (HCC) E11.9 250.00    4. Morbid obesity with BMI of 40.0-44.9, adult ([facility] E66.01 278.01     Z68.41 V85.41         Plan:     Patient presents doing well and stable from cardiac standpoint. Her symptoms are better. Stress test, echo normal. paient reassured. Discussed exercise, diet, weight loss. Continue current care and follow up as needed.     Narinder Hernandez MD

## 2018-04-30 ENCOUNTER — TELEPHONE (OUTPATIENT)
Dept: INTERNAL MEDICINE CLINIC | Age: 53
End: 2018-04-30

## 2018-04-30 NOTE — TELEPHONE ENCOUNTER
Patient states she needs a call back in reference to getting a Complete Physical appt by 5/13/18. Please call.  Thank you

## 2018-05-11 ENCOUNTER — HOSPITAL ENCOUNTER (OUTPATIENT)
Dept: LAB | Age: 53
Discharge: HOME | End: 2018-05-11
Payer: COMMERCIAL

## 2018-05-11 ENCOUNTER — OFFICE VISIT (OUTPATIENT)
Dept: INTERNAL MEDICINE CLINIC | Age: 53
End: 2018-05-11

## 2018-05-11 VITALS
DIASTOLIC BLOOD PRESSURE: 81 MMHG | SYSTOLIC BLOOD PRESSURE: 136 MMHG | OXYGEN SATURATION: 97 % | HEART RATE: 83 BPM | BODY MASS INDEX: 44.34 KG/M2 | TEMPERATURE: 98.7 F | HEIGHT: 68 IN | WEIGHT: 292.6 LBS | RESPIRATION RATE: 18 BRPM

## 2018-05-11 DIAGNOSIS — E11.9 CONTROLLED TYPE 2 DIABETES MELLITUS WITHOUT COMPLICATION, WITHOUT LONG-TERM CURRENT USE OF INSULIN (HCC): Primary | ICD-10-CM

## 2018-05-11 DIAGNOSIS — Z12.11 SCREENING FOR COLON CANCER: ICD-10-CM

## 2018-05-11 DIAGNOSIS — E66.01 MORBID OBESITY WITH BMI OF 40.0-44.9, ADULT (HCC): ICD-10-CM

## 2018-05-11 DIAGNOSIS — E03.9 ACQUIRED HYPOTHYROIDISM: ICD-10-CM

## 2018-05-11 DIAGNOSIS — I10 HYPERTENSION, UNSPECIFIED TYPE: ICD-10-CM

## 2018-05-11 DIAGNOSIS — Z12.39 SCREENING FOR BREAST CANCER: ICD-10-CM

## 2018-05-11 DIAGNOSIS — E55.9 VITAMIN D DEFICIENCY: ICD-10-CM

## 2018-05-11 DIAGNOSIS — Z12.4 SCREENING FOR CERVICAL CANCER: ICD-10-CM

## 2018-05-11 DIAGNOSIS — E78.00 PURE HYPERCHOLESTEROLEMIA: ICD-10-CM

## 2018-05-11 PROCEDURE — 88175 CYTOPATH C/V AUTO FLUID REDO: CPT | Performed by: FAMILY MEDICINE

## 2018-05-11 RX ORDER — CHOLECALCIFEROL (VITAMIN D3) 125 MCG
CAPSULE ORAL
COMMUNITY

## 2018-05-11 NOTE — MR AVS SNAPSHOT
[address] 
[phone] Patient: Juan Church MRN:  [id number] Visit Information Date & Time Provider Department Dept. Phone Encounter #  
 5/11/2018 11:45 AM Magalys Benitez, [address] [id number] Follow-up Instructions Return for follow up in 6 months on diabetes. Upcoming Health Maintenance Date Due  
 FOOT EXAM Q1 9/6/1975 MICROALBUMIN Q1 9/6/1975 Pneumococcal 19-64 Medium Risk (1 of 1 - PPSV23) 9/6/1984 DTaP/Tdap/Td series (1 - Tdap) 9/6/1986 BREAST CANCER SCRN MAMMOGRAM 9/6/2015 FOBT Q 1 YEAR AGE 50-75 9/6/2015 HEMOGLOBIN A1C Q6M 4/14/2018 EYE EXAM RETINAL OR DILATED Q1 2/1/2019* Influenza Age 5 to Adult 8/1/2018 LIPID PANEL Q1 10/14/2018 PAP AKA CERVICAL CYTOLOGY 5/11/2021 *Topic was postponed. The date shown is not the original due date. Allergies as of 5/11/2018  Review Complete On: 5/11/2018 By: Magalys Benitez MD  
 No Known Allergies Current Immunizations  Reviewed on 2/2/2011 Name Date Influenza Vaccine Whole 10/4/2010 Not reviewed this visit You Were Diagnosed With   
  
 Codes Comments Controlled type 2 diabetes mellitus without complication, without long-term current use of insulin ([facility]    -  Primary ICD-10-CM: E11.9 ICD-9-CM: 250.00 Screening for breast cancer     ICD-10-CM: Z12.31 
ICD-9-CM: V76.10 Morbid obesity with BMI of 40.0-44.9, adult (HCC)     ICD-10-CM: E66.01, Z68.41 
ICD-9-CM: 278.01, V85.41 Hypertension, unspecified type     ICD-10-CM: I10 
ICD-9-CM: 401.9 Pure hypercholesterolemia     ICD-10-CM: E78.00 ICD-9-CM: 272.0 Screening for colon cancer     ICD-10-CM: Z12.11 ICD-9-CM: V76.51 Vitamin D deficiency     ICD-10-CM: E55.9 ICD-9-CM: 268.9 Screening for cervical cancer     ICD-10-CM: Z12.4 ICD-9-CM: V76.2 Acquired hypothyroidism     ICD-10-CM: E03.9 ICD-9-CM: 048. 9 Vitals BP Pulse Temp Resp Height(growth percentile) Weight(growth percentile) 136/81 (BP 1 Location: Left arm, BP Patient Position: Sitting) 83 98.7 °F (37.1 °C) (Oral) 18 5' 8\" (1.727 m) 292 lb 9.6 oz (132.7 kg) SpO2 BMI OB Status Smoking Status 97% 44.49 kg/m2 Ablation Former Smoker Vitals History BMI and BSA Data Body Mass Index Body Surface Area 44.49 kg/m 2 2.52 m 2 [facility] [address], [address] [facility] [phone] Your Updated Medication List  
  
   
This list is accurate as of 5/11/18 12:38 PM.  Always use your most recent med list.  
  
  
  
  
 BIOTIN PO Take  by mouth. hydroCHLOROthiazide 25 mg tablet Commonly known as:  HYDRODIURIL  
TAKE ONE TABLET BY MOUTH ONCE DAILY losartan 50 mg tablet Commonly known as:  COZAAR  
TAKE 1 TABLET BY MOUTH EVERY DAY  
  
 naproxen 500 mg tablet Commonly known as:  NAPROSYN Take 1 Tab by mouth two (2) times daily (with meals). As needed for pain  
  
 traZODone 50 mg tablet Commonly known as:  Jaconita Odor Take 1 Tab by mouth nightly. VITAMIN D3 2,000 unit Tab Generic drug:  cholecalciferol (vitamin D3) Take  by mouth. We Performed the Following PAP (IMAGE GUIDED) W/REFL HPV ALL PATHOLOGY ([id number]) [CVP1459 Custom] REFERRAL TO GASTROENTEROLOGY [NFS97 Custom] Comments:  
 Colon screening. Follow-up Instructions Return for follow up in 6 months on diabetes. To-Do List   
 05/11/2018 Lab:  CBC W/O DIFF   
  
 05/11/2018 Lab:  HEMOGLOBIN A1C W/O EAG   
  
 05/11/2018 Lab:  LIPID PANEL   
  
 05/11/2018 Imaging:  [facility] 3D FREEDOM W MAMMO BI SCREENING INCL CAD   
  
 05/11/2018 Lab:  METABOLIC PANEL, COMPREHENSIVE   
  
 05/11/2018 Lab: MICROALBUMIN, UR, RAND W/ MICROALB/CREAT RATIO   
  
 05/11/2018 Lab:  TSH 3RD GENERATION   
  
 05/11/2018 Lab:  VITAMIN D, 25 HYDROXY Referral Information Referral ID Referred By Referred To  
  
 [id number] [facility] [place] [address] [place], [address] Visits Status Start Date End Date 1 New Request 5/11/18 5/11/19 If your referral has a status of pending review or denied, additional information will be sent to support the outcome of this decision. Introducing [facility] & HEALTH SERVICES! [facility] introduces [employer] patient portal. Now you can access parts of your medical record, email your doctor's office, and request medication refills online. 1. In your internet browser, go to [email or web address] 2. Click on the First Time User? Click Here link in the Sign In box. You will see the New Member Sign Up page. 3. Enter your [employer] Access Code exactly as it appears below. You will not need to use this code after youve completed the sign-up process. If you do not sign up before the expiration date, you must request a new code. · [employer] Access Code: [email or web address] Expires: 8/9/2018 12:38 PM 
 
4. Enter the last four digits of your Social Security Number (xxxx) and Date of Birth (mm/dd/yyyy) as indicated and click Submit. You will be taken to the next sign-up page. 5. Create a [employer] ID. This will be your [employer] login ID and cannot be changed, so think of one that is secure and easy to remember. 6. Create a [employer] password. You can change your password at any time. 7. Enter your Password Reset Question and Answer. This can be used at a later time if you forget your password. 8. Enter your e-mail address. You will receive e-mail notification when new information is available in [address] 9. Click Sign Up. You can now view and download portions of your medical record. 10. Click the Download Summary menu link to download a portable copy of your medical information. If you have questions, please visit the Frequently Asked Questions section of the [employer] website. Remember, [employer] is NOT to be used for urgent needs. For medical emergencies, dial 911. Now available from your iPhone and Android! Please provide this summary of care documentation to your next provider. Your primary care clinician is listed as Gertrudis Fountain. If you have any questions after today's visit, please call [phone].

## 2018-05-11 NOTE — PROGRESS NOTES
Lawrence Mae is a 46 y.o. female who is here for an annual physical with pap. Prior endometrial ablation. Had bleeding once, last year, saw gyn. No DUB. Overdue for mammogram.      Taking trazodone 50mg at bedtime, sleeping well. Taking losartan 50mg once a day and HCTZ 25mg once a day. BP controlled. No dizziness or headaches. Had left Achilles tendonitis, sees podiatry, had boot for 8 weeks and PT for 6 weeks. The symptoms come and go now. Not doing exercises as regularly. Is able to be active now, took a boot camp class. felt better Trying to follow diabetic diet. Weight was 289# in sept 2017 and 292# now. Had gained to 300. Last HbA1C 6.2% in October 2017. On diabetic diet. No medications for diabetes. Reduced carbs, drinks calorie free. Has not reduced portions. Leg will swell at times. Using compression socks. Using naproxen only prn now. Normal BM. No prior colon cancer screening.         No Known Allergies     Social History     Social History    Marital status:      Spouse name: N/A    Number of children: N/A    Years of education: N/A     Social History Main Topics    Smoking status: Former Smoker     Packs/day: 1.50     Years: 12.00     Types: Cigarettes     Quit date: 2/2/1995    Smokeless tobacco: Never Used    Alcohol use No    Drug use: No    Sexual activity: Yes     Partners: Male     Birth control/ protection: None     Other Topics Concern    None     Social History Narrative        Past Medical History:   Diagnosis Date    HTN (hypertension) 4/6/2010    LAP-BAND surgery status 3/2006    band dislodged 5/2006 and not yet replaced      Pure hypercholesterolemia 4/30/2010    Type II or unspecified type diabetes mellitus without mention of complication, not stated as uncontrolled 6/4/2011    not currently on medication        Past Surgical History:   Procedure Laterality Date    HX BREAST REDUCTION      HX ORTHOPAEDIC  2015    torn meniscus, left    HX TUBAL LIGATION      LAP, PLACE ADJUST GASTR BAND         Family History   Problem Relation Age of Onset    No Known Problems Mother     Hypertension Father     No Known Problems Sister         Current Outpatient Prescriptions   Medication Sig Dispense Refill    cholecalciferol, vitamin D3, (VITAMIN D3) 2,000 unit tab Take  by mouth.  traZODone (DESYREL) 50 mg tablet Take 1 Tab by mouth nightly. 30 Tab 3    hydroCHLOROthiazide (HYDRODIURIL) 25 mg tablet TAKE ONE TABLET BY MOUTH ONCE DAILY 90 Tab 3    losartan (COZAAR) 50 mg tablet TAKE 1 TABLET BY MOUTH EVERY DAY 30 Tab 11    BIOTIN PO Take  by mouth.  naproxen (NAPROSYN) 500 mg tablet Take 1 Tab by mouth two (2) times daily (with meals). As needed for pain (Patient taking differently: Take 500 mg by mouth two (2) times daily (with meals). As needed for pain  Indications: prn) 60 Tab 2          ROS:  General: negative for fevers, chills, anorexia, weight loss  Eyes:   negative for visual disturbance, irritation  ENT:   negative for tinnitus,sore throat,nasal congestion,ear pains. hoarseness  Resp:   negative for cough, hemoptysis, dyspnea,wheezing  CV:   negative for chest pain, palpitations, lower extremity edema  GI:   negative for nausea, vomiting, diarrhea, abdominal pain,melena  Endo:              negative for polyuria,polydipsia,polyphagia,heat intolerance  :  negative for frequency, dysuria, hematuria, no vaginal discharge  Skin:   negative for rash, pruritus  Heme:  negative for easy bruising, gum/nose bleeding  Musc:  negative for myalgias, arthralgias, back pain, muscle weakness, joint pain  Neuro:  negative for headaches, dizziness, numbness, focal weakness  Psych:  negative for feelings of anxiety, depression, mood changes      Blood pressure 136/81, pulse 83, temperature 98.7 °F (37.1 °C), temperature source Oral, resp. rate 18, height 5' 8\" (1.727 m), weight 292 lb 9.6 oz (132.7 kg), SpO2 97 %.           Body mass index is 44.49 kg/(m^2). General: Well, no acute distress  HEENT:   PERRL,normal conjunctiva. External ear and canals normal, TMs normal.  Hearing normal to voice. Nose without edema or discharge, with normal septum. Lips, teeth, gums normal.  Oropharynx: no erythema, no exudates, no lesions, normal tongue. Neck:  Supple. Thyroid normal size, nontender, without nodules. No carotid bruit. No masses or LAD  Resp:  No wheezing, no rhonchi, no rales. No chest wall tenderness. CV:  RRR, normal S1S2, no murmur. GI: soft, nontender, without masses. No hepatosplenomegaly. Extrem:  +2 pulses, no edema, warm distally  Neuro: alert, nonfocal  Psych: Samreen Carson Affect is alert and attentive. Breasts: no axillary LAD, normal nipples without discharge, no masses  Pelvic: normal external genitalia, speculum with normal vaginal mucosa, no abnormal discharge or lesions, normal appearing cervix, bimanual exam no CMT, no adnexal tenderness or masses      Assessment and Plan:      1. Screening for breast cancer    - [facility] 3D FREEDOM W MAMMO BI SCREENING INCL CAD; Future    2. Controlled type 2 diabetes mellitus without complication, without long-term current use of insulin ([address]- Recommend compliance with diabetic diet. Continue medications for diabetes. Monitor blood sugar readings as discussed. Keep up on regular diabetic eye exams.    - METABOLIC PANEL, COMPREHENSIVE; Future  - HEMOGLOBIN A1C W/O EAG; Future  - MICROALBUMIN, UR, RAND W/ MICROALB/CREAT RATIO; Future    3. Morbid obesity with BMI of 40.0-44.9, adult ([address]      4. Hypertension, unspecified type- Recommend following a lower sodium diet and stay active. Blood pressure goal is less than 130/85 on average. Advised compliance with blood pressure medication and regular follow up. - CBC W/O DIFF; Future    5. Pure hypercholesterolemia- Recommend AHA diet. Continue statin therapy. - LIPID PANEL; Future    6. Screening for colon cancer    - REFERRAL TO GASTROENTEROLOGY    7. Vitamin D deficiency    - VITAMIN D, 25 HYDROXY; Future    8. Screening for cervical cancer    - PAP (IMAGE GUIDED) W/REFL HPV ALL PATHOLOGY ([id number])    9. Acquired hypothyroidism- Recommend taking thyroid medication daily on empty stomach and regular follow up.    - TSH 3RD GENERATION; Future    Follow-up Disposition:  Return for follow up in 6 months on diabetes.      Sheba Klein MD

## 2018-05-11 NOTE — PROGRESS NOTES
Reviewed record in preparation for visit and have obtained necessary documentation. Identified pt with two pt identifiers(name and ). Chief Complaint   Patient presents with    Complete Physical     Pt non fasting    Well Woman       Health Maintenance Due   Topic Date Due    Diabetic Foot Care  1975    Albumin Urine Test  1975    Pneumococcal Vaccine (1 of 1 - PPSV23) 1984    DTaP/Tdap/Td  (1 - Tdap) 1986    Breast Cancer Screening  2015    Stool testing for trace blood  2015    Hemoglobin A1C    2018       Ms. Raza Guevara has a reminder for a \"due or due soon\" health maintenance. I have asked that she discuss this further with her primary care provider for follow-up on this health maintenance. Coordination of Care Questionnaire:  :     1) Have you been to an emergency room, urgent care clinic since your last visit? no   Hospitalized since your last visit? no             2) Have you seen or consulted any other health care providers outside of [address] [place] [address] since your last visit? no  (Include any pap smears or colon screenings in this section.)    3) In the event something were to happen to you and you were unable to speak on your behalf, do you have an Advance Directive/ Living Will in place stating your wishes? NO    Do you have an Advance Directive on file? no    4) Are you interested in receiving information on Advance Directives? YES    Patient is accompanied by self I have received verbal consent from Silvestre Gottlieb to discuss any/all medical information while they are present in the room.

## 2018-05-17 NOTE — PROGRESS NOTES
Spoke with patient. Two pt identifiers confirmed. Patient notified per Dr. Santos Portlilo that her pap was normal and to recheck in 1-2 years. Pt verbalized understanding of information discussed w/ no further questions at this time.

## 2018-06-05 ENCOUNTER — OFFICE VISIT (OUTPATIENT)
Dept: INTERNAL MEDICINE CLINIC | Age: 53
End: 2018-06-05

## 2018-06-05 VITALS
HEIGHT: 68 IN | HEART RATE: 78 BPM | SYSTOLIC BLOOD PRESSURE: 154 MMHG | TEMPERATURE: 99 F | OXYGEN SATURATION: 96 % | WEIGHT: 293 LBS | RESPIRATION RATE: 16 BRPM | DIASTOLIC BLOOD PRESSURE: 81 MMHG | BODY MASS INDEX: 44.41 KG/M2

## 2018-06-05 DIAGNOSIS — M79.604 RIGHT LEG PAIN: Primary | ICD-10-CM

## 2018-06-05 NOTE — PROGRESS NOTES
SUBJECTIVE  Ms. Nevaeh Santamaria presents today acutely for     Chief Complaint   Patient presents with    Swelling     pt here today c/o swelling in R leg/knee; pt states that she had done alot of running around on Sat & Sun; on Sun nitte pt began to have pain in R leg; on Mon pt had tiightness/swelling in R leg ; today pt states swelling has gone down and that she still has pain       Two days ago, she had massive swelling in R medial distal thigh and medial knee. Nurse at work felt it may be a bug bite. She propped up the leg, and the swelling went down. Still feels \"tight\" and painful. \"I'm worried because the R leg is the good leg. \"     OBJECTIVE  Visit Vitals    /81 (BP 1 Location: Left arm, BP Patient Position: Sitting)    Pulse 78    Temp 99 °F (37.2 °C) (Oral)    Resp 16    Ht 5' 8\" (1.727 m)    Wt 294 lb 6.4 oz (133.5 kg)    SpO2 96%    BMI 44.76 kg/m2     Gen: Pleasant 46 y.o.  female in NAD.   She is obese.    EXTREMITIES: Warm. Mild TTP medial distal R thigh. MUSCULOSKELETAL: Crepitus with extension and flexion of R leg. Normal ROM, muscle strength 5/5 all groups. NEURO: Alert and oriented x 3.  Cranial nerves grossly intact.  No focal sensory or motor deficits noted. SKIN: Warm. Dry. No rashes or other lesions noted. ASSESSMENT   R leg pain and swelling: Localized inflammatory response vs muscle spasm, for now improved. PLAN  For now reassured. Rest, Ice, elevation, as needed. May continue Naprosyn. I have reviewed with the patient details of the assessment and plan and all questions were answered. Relevant patient education was performed. Follow-up Disposition:  Return if symptoms worsen or fail to improve.

## 2018-06-06 NOTE — TELEPHONE ENCOUNTER
Requested Prescriptions     Pending Prescriptions Disp Refills    naproxen (NAPROSYN) 500 mg tablet 60 Tab 2     Sig: Take 1 Tab by mouth two (2) times daily (with meals). As needed for pain     PCP: Elizabeth Reyes MD    Last appt: 6/5/2018  Future Appointments  Date Time Provider Jacob Chuyita   11/9/2018 11:00 AM Elizabeth Reyes MD [facility] 87       Requested Prescriptions     Pending Prescriptions Disp Refills    naproxen (NAPROSYN) 500 mg tablet 60 Tab 2     Sig: Take 1 Tab by mouth two (2) times daily (with meals).  As needed for pain

## 2018-06-07 RX ORDER — NAPROXEN 500 MG/1
500 TABLET ORAL 2 TIMES DAILY WITH MEALS
Qty: 60 TAB | Refills: 2 | Status: SHIPPED | OUTPATIENT
Start: 2018-06-07 | End: 2018-08-30 | Stop reason: ALTCHOICE

## 2018-06-18 ENCOUNTER — TELEPHONE (OUTPATIENT)
Dept: INTERNAL MEDICINE CLINIC | Age: 53
End: 2018-06-18

## 2018-06-18 NOTE — TELEPHONE ENCOUNTER
Spoke with patient. Two pt identifiers confirmed. Patient states that she was seen by Dr. Jason Wei on 06/05/18 for right leg pain and swelling. Patient states that she is still having a lot of pain and numbness. Patient states that she has tried rest, ice and naprosyn and that did not seem to help. Patient states that she would like to know if Dr. Trevor Guallpa thinks she would see Ortho. Advised patient that I will have to check with Dr. Trevor Guallpa and will call her back with recommendations. Pt verbalized understanding of information discussed w/ no further questions at this time.

## 2018-06-18 NOTE — TELEPHONE ENCOUNTER
BEST C(190) [id number]   Pt stated, her (R) leg is numb and having difficulty walking.   Pt would like a call back advising if a f/up appt is needed or should she see the Orthopedic specialist.         Message received & copied from [facility]

## 2018-06-19 NOTE — TELEPHONE ENCOUNTER
MD Timothy Reed LPN        Caller: Unspecified (Yesterday,  8:48 AM)                     Should be seen again. I am not sure what is causing her symptoms without examining her.        Spoke with patient. Two pt identifiers confirmed. Patient states that she has already scheduled an appointment to see Ortho on 06/20/18 @ 10am.  Advised patient to keep that appointment. Pt verbalized understanding of information discussed w/ no further questions at this time.

## 2018-08-24 ENCOUNTER — OFFICE VISIT (OUTPATIENT)
Dept: INTERNAL MEDICINE CLINIC | Age: 53
End: 2018-08-24

## 2018-08-24 VITALS
TEMPERATURE: 98.3 F | WEIGHT: 289.8 LBS | OXYGEN SATURATION: 99 % | HEART RATE: 78 BPM | BODY MASS INDEX: 43.92 KG/M2 | DIASTOLIC BLOOD PRESSURE: 79 MMHG | RESPIRATION RATE: 18 BRPM | HEIGHT: 68 IN | SYSTOLIC BLOOD PRESSURE: 156 MMHG

## 2018-08-24 DIAGNOSIS — J01.00 ACUTE MAXILLARY SINUSITIS, RECURRENCE NOT SPECIFIED: Primary | ICD-10-CM

## 2018-08-24 RX ORDER — FLUCONAZOLE 150 MG/1
150 TABLET ORAL DAILY
Qty: 1 TAB | Refills: 0 | Status: SHIPPED | OUTPATIENT
Start: 2018-08-24 | End: 2021-11-05 | Stop reason: SDUPTHER

## 2018-08-24 RX ORDER — FLUCONAZOLE 150 MG/1
150 TABLET ORAL DAILY
Qty: 1 TAB | Refills: 0 | Status: SHIPPED | OUTPATIENT
Start: 2018-08-24 | End: 2018-08-24 | Stop reason: SDUPTHER

## 2018-08-24 RX ORDER — AMOXICILLIN AND CLAVULANATE POTASSIUM 875; 125 MG/1; MG/1
1 TABLET, FILM COATED ORAL EVERY 12 HOURS
Qty: 20 TAB | Refills: 0 | Status: SHIPPED | OUTPATIENT
Start: 2018-08-24 | End: 2021-11-05 | Stop reason: SDUPTHER

## 2018-08-24 NOTE — PATIENT INSTRUCTIONS
Sinusitis: Care Instructions  Your Care Instructions    Sinusitis is an infection of the lining of the sinus cavities in your head. Sinusitis often follows a cold. It causes pain and pressure in your head and face. In most cases, sinusitis gets better on its own in 1 to 2 weeks. But some mild symptoms may last for several weeks. Sometimes antibiotics are needed. Follow-up care is a key part of your treatment and safety. Be sure to make and go to all appointments, and call your doctor if you are having problems. It's also a good idea to know your test results and keep a list of the medicines you take. How can you care for yourself at home? · Take an over-the-counter pain medicine, such as acetaminophen (Tylenol), ibuprofen (Advil, Motrin), or naproxen (Aleve). Read and follow all instructions on the label. · If the doctor prescribed antibiotics, take them as directed. Do not stop taking them just because you feel better. You need to take the full course of antibiotics. · Be careful when taking over-the-counter cold or flu medicines and Tylenol at the same time. Many of these medicines have acetaminophen, which is Tylenol. Read the labels to make sure that you are not taking more than the recommended dose. Too much acetaminophen (Tylenol) can be harmful. · Breathe warm, moist air from a steamy shower, a hot bath, or a sink filled with hot water. Avoid cold, dry air. Using a humidifier in your home may help. Follow the directions for cleaning the machine. · Use saline (saltwater) nasal washes to help keep your nasal passages open and wash out mucus and bacteria. You can buy saline nose drops at a grocery store or drugstore. Or you can make your own at home by adding 1 teaspoon of salt and 1 teaspoon of baking soda to 2 cups of distilled water. If you make your own, fill a bulb syringe with the solution, insert the tip into your nostril, and squeeze gently. Yary [place] your nose.   · Put a hot, wet towel or a warm gel pack on your face 3 or 4 times a day for 5 to 10 minutes each time. · Try a decongestant nasal spray like oxymetazoline (Afrin). Do not use it for more than 3 days in a row. Using it for more than 3 days can make your congestion worse. When should you call for help? Call your doctor now or seek immediate medical care if:    · You have new or worse swelling or redness in your face or around your eyes.     · You have a new or higher fever.    Watch closely for changes in your health, and be sure to contact your doctor if:    · You have new or worse facial pain.     · The mucus from your nose becomes thicker (like pus) or has new blood in it.     · You are not getting better as expected. Where can you learn more? Go to http://gem-james.info/. Enter T620 in the search box to learn more about \"Sinusitis: Care Instructions. \"  Current as of: May 12, 2017  Content Version: 11.7  © 5498-4946 [employer]. Care instructions adapted under license by [employer] (which disclaims liability or warranty for this information). If you have questions about a medical condition or this instruction, always ask your healthcare professional. [address] any warranty or liability for your use of this information.

## 2018-08-24 NOTE — PROGRESS NOTES
SUBJECTIVE  Ms. Dmitry Gunter is a patient of Finley Bosworth, MD and presents today acutely for     Chief Complaint   Patient presents with    Cold Symptoms     \"Sinus Cold\"      She began with mild cough and sore throat. Now she is having sinus pain and pressure on the L side of her face. +Congestion and nasal drainage. Started over a week ago, progressively worsening the last three days. She has taken flonase. Has taken \"Aleve sinus and allergy. \"     OBJECTIVE  Visit Vitals    /79 (BP 1 Location: Left arm, BP Patient Position: Sitting)    Pulse 78    Temp 98.3 °F (36.8 °C) (Oral)    Resp 18    Ht 5' 8\" (1.727 m)    Wt 289 lb 12.8 oz (131.5 kg)    SpO2 99%    BMI 44.06 kg/m2     Gen: Pleasant 46 y.o.  female in NAD.   HEENT: PERRLA. EOMI. OP moist and pink. +TTP over L max sinus. Neck: Supple.  No LAD.  HEART: RRR, No M/G/R.   LUNGS: CTAB No W/R.   ABDOMEN: S, NT, ND, BS+.   EXTREMITIES: Warm. ASSESSMENT   Acute sinusitis    PLAN  Rest, fluids, etc.     Orders Placed This Encounter    amoxicillin-clavulanate (AUGMENTIN) 875-125 mg per tablet     Sig: Take 1 Tab by mouth every twelve (12) hours for 10 days. Dispense:  20 Tab     Refill:  0    fluconazole (DIFLUCAN) 150 mg tablet     Sig: Take 1 Tab by mouth daily for 1 day. FDA advises cautious prescribing of oral fluconazole in pregnancy. Dispense:  1 Tab     Refill:  0       I have reviewed with the patient details of the assessment and plan and all questions were answered. Relevant patient education was performed. Follow-up Disposition:  Return if symptoms worsen or fail to improve.

## 2018-08-24 NOTE — MR AVS SNAPSHOT
[address] [facility] 
[phone] Patient: Adán Yu MRN:  [id number] Visit Information Date & Time Provider Department Dept. Phone Encounter #  
 8/24/2018  2:15 PM Lakisha Saha, [address] [place] [address],[address] [phone] [id number] Follow-up Instructions Return if symptoms worsen or fail to improve. Your Appointments 11/9/2018 11:00 AM  
ROUTINE CARE with Natalie Mcdaniel MD  
[facility] [address] [facility]) Appt Note: 6 month follow up  
 [address] [facility] [address] [id number]  
[address] [place] [address] [address] [facility] Upcoming Health Maintenance Date Due  
 FOOT EXAM Q1 9/6/1975 MICROALBUMIN Q1 9/6/1975 Pneumococcal 19-64 Medium Risk (1 of 1 - PPSV23) 9/6/1984 DTaP/Tdap/Td series (1 - Tdap) 9/6/1986 BREAST CANCER SCRN MAMMOGRAM 9/6/2015 FOBT Q 1 YEAR AGE 50-75 9/6/2015 HEMOGLOBIN A1C Q6M 4/14/2018 Influenza Age 5 to Adult 8/1/2018 EYE EXAM RETINAL OR DILATED Q1 2/1/2019* LIPID PANEL Q1 10/14/2018 PAP AKA CERVICAL CYTOLOGY 5/11/2021 *Topic was postponed. The date shown is not the original due date. Allergies as of 8/24/2018  Review Complete On: 8/24/2018 By: Lakisha Saha MD  
 No Known Allergies Current Immunizations  Reviewed on 2/2/2011 Name Date Influenza Vaccine Whole 10/4/2010 Not reviewed this visit You Were Diagnosed With   
  
 Codes Comments Acute maxillary sinusitis, recurrence not specified    -  Primary ICD-10-CM: J01.00 ICD-9-CM: 461.0 Vitals BP Pulse Temp Resp Height(growth percentile) Weight(growth percentile) 156/79 (BP 1 Location: Left arm, BP Patient Position: Sitting) 78 98.3 °F (36.8 °C) (Oral) 18 5' 8\" (1.727 m) 289 lb 12.8 oz (131.5 kg) SpO2 BMI OB Status Smoking Status 99% 44.06 kg/m2 Ablation Former Smoker Vitals History BMI and BSA Data Body Mass Index Body Surface Area 44.06 kg/m 2 2.51 m 2 Preferred Pharmacy Pharmacy Name Phone [facility]ero Gallego [facility] [address] [facility] [phone] Your Updated Medication List  
  
   
This list is accurate as of 8/24/18  2:41 PM.  Always use your most recent med list.  
  
  
  
  
 amoxicillin-clavulanate 875-125 mg per tablet Commonly known as:  AUGMENTIN Take 1 Tab by mouth every twelve (12) hours for 10 days. BIOTIN PO Take  by mouth. fluconazole 150 mg tablet Commonly known as:  DIFLUCAN Take 1 Tab by mouth daily for 1 day. FDA advises cautious prescribing of oral fluconazole in pregnancy. hydroCHLOROthiazide 25 mg tablet Commonly known as:  HYDRODIURIL  
TAKE ONE TABLET BY MOUTH ONCE DAILY losartan 50 mg tablet Commonly known as:  COZAAR  
TAKE 1 TABLET BY MOUTH EVERY DAY  
  
 naproxen 500 mg tablet Commonly known as:  NAPROSYN Take 1 Tab by mouth two (2) times daily (with meals). As needed for pain OTHER Tumeric w/ginger po  
  
 traZODone 50 mg tablet Commonly known as:  Cayden Ruth Take 1 Tab by mouth nightly. VITAMIN D3 2,000 unit Tab Generic drug:  cholecalciferol (vitamin D3) Take  by mouth. Prescriptions Sent to Pharmacy Refills  
 amoxicillin-clavulanate (AUGMENTIN) 875-125 mg per tablet 0 Sig: Take 1 Tab by mouth every twelve (12) hours for 10 days. Class: Normal  
 Pharmacy: [facility] [address] [place] [address] Ph #: [phone] Route: Oral  
 fluconazole (DIFLUCAN) 150 mg tablet 0 Sig: Take 1 Tab by mouth daily for 1 day. FDA advises cautious prescribing of oral fluconazole in pregnancy.   
 Class: Normal  
 Pharmacy: [facility] [address], [address] AT [address] #: [phone] Route: Oral  
  
Follow-up Instructions Return if symptoms worsen or fail to improve. Patient Instructions Sinusitis: Care Instructions Your Care Instructions Sinusitis is an infection of the lining of the sinus cavities in your head. Sinusitis often follows a cold. It causes pain and pressure in your head and face. In most cases, sinusitis gets better on its own in 1 to 2 weeks. But some mild symptoms may last for several weeks. Sometimes antibiotics are needed. Follow-up care is a key part of your treatment and safety. Be sure to make and go to all appointments, and call your doctor if you are having problems. It's also a good idea to know your test results and keep a list of the medicines you take. How can you care for yourself at home? · Take an over-the-counter pain medicine, such as acetaminophen (Tylenol), ibuprofen (Advil, Motrin), or naproxen (Aleve). Read and follow all instructions on the label. · If the doctor prescribed antibiotics, take them as directed. Do not stop taking them just because you feel better. You need to take the full course of antibiotics. · Be careful when taking over-the-counter cold or flu medicines and Tylenol at the same time. Many of these medicines have acetaminophen, which is Tylenol. Read the labels to make sure that you are not taking more than the recommended dose. Too much acetaminophen (Tylenol) can be harmful. · Breathe warm, moist air from a steamy shower, a hot bath, or a sink filled with hot water. Avoid cold, dry air. Using a humidifier in your home may help. Follow the directions for cleaning the machine. · Use saline (saltwater) nasal washes to help keep your nasal passages open and wash out mucus and bacteria.  You can buy saline nose drops at a grocery store or drugstore. Or you can make your own at home by adding 1 teaspoon of salt and 1 teaspoon of baking soda to 2 cups of distilled water. If you make your own, fill a bulb syringe with the solution, insert the tip into your nostril, and squeeze gently. [place] your nose. · Put a hot, wet towel or a warm gel pack on your face 3 or 4 times a day for 5 to 10 minutes each time. · Try a decongestant nasal spray like oxymetazoline (Afrin). Do not use it for more than 3 days in a row. Using it for more than 3 days can make your congestion worse. When should you call for help? Call your doctor now or seek immediate medical care if: 
  · You have new or worse swelling or redness in your face or around your eyes.  
  · You have a new or higher fever.  
 Watch closely for changes in your health, and be sure to contact your doctor if: 
  · You have new or worse facial pain.  
  · The mucus from your nose becomes thicker (like pus) or has new blood in it.  
  · You are not getting better as expected. Where can you learn more? Go to http://gem-james.info/. Enter H776 in the search box to learn more about \"Sinusitis: Care Instructions. \" Current as of: May 12, 2017 Content Version: 11.7 © 4022-4277 [employer]. Care instructions adapted under license by [employer] (which disclaims liability or warranty for this information). If you have questions about a medical condition or this instruction, always ask your healthcare professional. [place] [address] any warranty or liability for your use of this information. Introducing [facility]! [employer] introduces [employer] patient portal. Now you can access parts of your medical record, email your doctor's office, and request medication refills online. 1. In your internet browser, go to [email or web address] 2. Click on the First Time User? Click Here link in the Sign In box. You will see the New Member Sign Up page. 3. Enter your Appolicious Access Code exactly as it appears below. You will not need to use this code after youve completed the sign-up process. If you do not sign up before the expiration date, you must request a new code. · Appolicious Access Code: [email or web address] Expires: 11/22/2018  2:41 PM 
 
4. Enter the last four digits of your Social Security Number (xxxx) and Date of Birth (mm/dd/yyyy) as indicated and click Submit. You will be taken to the next sign-up page. 5. Create a Appolicious ID. This will be your Appolicious login ID and cannot be changed, so think of one that is secure and easy to remember. 6. Create a Appolicious password. You can change your password at any time. 7. Enter your Password Reset Question and Answer. This can be used at a later time if you forget your password. 8. Enter your e-mail address. You will receive e-mail notification when new information is available in [address] 9. Click Sign Up. You can now view and download portions of your medical record. 10. Click the Download Summary menu link to download a portable copy of your medical information. If you have questions, please visit the Frequently Asked Questions section of the Appolicious website. Remember, Appolicious is NOT to be used for urgent needs. For medical emergencies, dial 911. Now available from your iPhone and Android! Please provide this summary of care documentation to your next provider. Your primary care clinician is listed as Marycarmen Manual. If you have any questions after today's visit, please call [phone].

## 2018-08-29 ENCOUNTER — TELEPHONE (OUTPATIENT)
Dept: INTERNAL MEDICINE CLINIC | Age: 53
End: 2018-08-29

## 2018-08-29 DIAGNOSIS — M25.561 ACUTE PAIN OF RIGHT KNEE: Primary | ICD-10-CM

## 2018-08-29 NOTE — TELEPHONE ENCOUNTER
[id number]  Pt is having swelling of right knee and has been taking Naproxen for on and off three years and the swelling still continues with lots of pt did not know if she needed to be prescribed something else or maybe need to see a specialist. Stated that ice no longer works either

## 2018-08-29 NOTE — TELEPHONE ENCOUNTER
We have old xrays of the left knee, which was normal, but we do not have any xrays of the right knee. Recommend she come in for xray and check for osteoarthritis. Use ice, 10 minutes, ACE wrap or brace,  Elevation and rest.  Back off on the naproxen since not helping. We can send in mobic once a day anti-inflammatory.

## 2018-08-30 RX ORDER — MELOXICAM 15 MG/1
15 TABLET ORAL DAILY
Qty: 30 TAB | Refills: 2 | Status: SHIPPED | OUTPATIENT
Start: 2018-08-30 | End: 2019-01-07

## 2018-08-30 NOTE — TELEPHONE ENCOUNTER
Left message for patient that a prescription has been sent in mobic 15mg once a day. Xray order in system and she can go for that anytime. Can keep appt with us in case needed later, but do xray now.

## 2018-08-30 NOTE — TELEPHONE ENCOUNTER
I sent in mobic 15mg once a day. Xray order in system and she can go for that anytime. Can keep appt with us in case needed later, but do xray now.

## 2018-08-30 NOTE — TELEPHONE ENCOUNTER
Spoke with patient. Two pt identifiers confirmed. Patient advised per Dr. Brijesh Javed that we have old xrays of the left knee, which was normal, but we do not have any xrays of the right knee. Recommend she come in for xray and check for osteoarthritis. Use ice, 10 minutes, ACE wrap or brace,  Elevation and rest.  Back off on the naproxen since not helping. We can send in mobic once a day anti-inflammatory. Appointment scheduled for 09/13/18 at 9:45am.  Patient would like to try the mobic also. Advised patient that I will send the prescription to Dr. Brijesh Javed for her to approve and send to the patients pharmacy. Pt verbalized understanding of information discussed w/ no further questions at this time.

## 2018-09-10 DIAGNOSIS — M25.561 RIGHT KNEE PAIN, UNSPECIFIED CHRONICITY: Primary | ICD-10-CM

## 2018-09-11 ENCOUNTER — TELEPHONE (OUTPATIENT)
Dept: INTERNAL MEDICINE CLINIC | Age: 53
End: 2018-09-11

## 2018-09-11 DIAGNOSIS — M17.11 PRIMARY OSTEOARTHRITIS OF RIGHT KNEE: Primary | ICD-10-CM

## 2018-09-11 NOTE — TELEPHONE ENCOUNTER
Spoke with patient. Two pt identifiers confirmed. Patient states that she is still having a lot of pain in her knee and her leg is still swollen. Patient states that the meloxicam is not working for her and would like to know what the next step is. Advised patient that I will have Dr. Anisa Mitchell review her xray from 09/10/18 and I will call her with her recommendations. Pt verbalized understanding of information discussed w/ no further questions at this time.

## 2018-09-11 NOTE — TELEPHONE ENCOUNTER
#[phone] pt states she would like her x ray results from yesterday, 9-10-18 as she thinks it is more than arthritis. She states the medication does not work for her that was prescribed.   Please call

## 2018-09-11 NOTE — TELEPHONE ENCOUNTER
MD Marie Castellano LPN        Caller: Unspecified (Today,  9:19 AM)                     Ref[facility] to [facility]       Spoke with patient. Two pt identifiers confirmed. Patient advised that Dr. Silvia Barry would like to refer her to Dr. Bianca Pollard. Patient given contact information.

## 2018-09-12 ENCOUNTER — TELEPHONE (OUTPATIENT)
Dept: INTERNAL MEDICINE CLINIC | Age: 53
End: 2018-09-12

## 2018-09-12 NOTE — TELEPHONE ENCOUNTER
Spoke with patient. Two pt identifiers confirmed. Patient would like to know if she can get a temporary handicap placard until she figures out what is causing all the pain in her knee. Patient states that she works out at [employer] and sometimes has to walk a long distance to get into the building. Advised patient that I will check with Dr. Rohan Condon and call her back once I have an answer. Pt verbalized understanding of information discussed w/ no further questions at this time.

## 2018-09-12 NOTE — TELEPHONE ENCOUNTER
----- Message from Che Sanders sent at 9/12/2018  2:37 PM EDT -----  Regarding: Dr. Elie Friedman telephone  Pt is requesting a call back about information on handicap parking tags.        Best contact [phone]        Message copied/pasted from [facility]

## 2018-09-13 NOTE — TELEPHONE ENCOUNTER
MD Gregory Chahal LPN        Caller: Unspecified Daune Luis Antonio,  2:53 PM)                     Advise patient temporary disabled parking permit is done, ready for        Left message for patient with the above information.

## 2018-09-18 ENCOUNTER — TELEPHONE (OUTPATIENT)
Dept: INTERNAL MEDICINE CLINIC | Age: 53
End: 2018-09-18

## 2018-09-18 RX ORDER — FLUCONAZOLE 150 MG/1
TABLET ORAL
Qty: 2 TAB | Refills: 1 | Status: SHIPPED | OUTPATIENT
Start: 2018-09-18 | End: 2019-01-07

## 2018-09-18 NOTE — TELEPHONE ENCOUNTER
Pt is requesting her medication for her yeast infection (doesnt know the name) to be sent to [facility] [address] (on file).  Best contact number: [phone]       Message received & copied from Stef Cuello

## 2018-09-18 NOTE — TELEPHONE ENCOUNTER
Pt stated she has to come into the practice today, and would like a written script for Diflucan if possible? Pt was seen on 8/24/2018 and was prescribed a Diflucan pill and was told that if the one pill didn't work for her to call back in. (Pt stated that she normally is prescribed two in the past) Pt would like to  the script when she comes in at 2:20pm today if possible?  Best contact [phone]       Message received & copied from [facility] ([facility])

## 2018-09-19 NOTE — TELEPHONE ENCOUNTER
Left message for patient that her prescription for diflucan has been called into the [facility] on file. Advised patient to call the office with any additional questions.

## 2018-10-08 RX ORDER — HYDROCHLOROTHIAZIDE 25 MG/1
TABLET ORAL
Qty: 90 TAB | Refills: 0 | Status: SHIPPED | OUTPATIENT
Start: 2018-10-08 | End: 2019-01-11 | Stop reason: SDUPTHER

## 2018-10-10 DIAGNOSIS — I10 ESSENTIAL HYPERTENSION: ICD-10-CM

## 2018-10-11 RX ORDER — LOSARTAN POTASSIUM 50 MG/1
TABLET ORAL
Qty: 30 TAB | Refills: 0 | Status: SHIPPED | OUTPATIENT
Start: 2018-10-11 | End: 2018-11-11 | Stop reason: SDUPTHER

## 2018-11-08 ENCOUNTER — TELEPHONE (OUTPATIENT)
Dept: INTERNAL MEDICINE CLINIC | Age: 53
End: 2018-11-08

## 2018-11-08 NOTE — TELEPHONE ENCOUNTER
----- Message from Samara Calderon sent at 11/8/2018  1:58 PM EST -----  Regarding: Dr. Dre Holcomb  Pt requesting a new lab order paper, stated misplace last one and need labs done prior to appt. Also stated she rescheduled appt for 11/09/18 for 01/07/19 with Dr. Ronal Kamara. Best contact [phone] or [phone].          Message copied/pasted from [facility]

## 2018-11-11 DIAGNOSIS — I10 ESSENTIAL HYPERTENSION: ICD-10-CM

## 2018-11-12 RX ORDER — LOSARTAN POTASSIUM 50 MG/1
TABLET ORAL
Qty: 30 TAB | Refills: 0 | Status: SHIPPED | OUTPATIENT
Start: 2018-11-12 | End: 2018-12-14 | Stop reason: SDUPTHER

## 2018-12-14 DIAGNOSIS — I10 ESSENTIAL HYPERTENSION: ICD-10-CM

## 2018-12-14 RX ORDER — LOSARTAN POTASSIUM 50 MG/1
TABLET ORAL
Qty: 30 TAB | Refills: 0 | Status: SHIPPED | OUTPATIENT
Start: 2018-12-14 | End: 2019-01-11 | Stop reason: SDUPTHER

## 2018-12-20 ENCOUNTER — TELEPHONE (OUTPATIENT)
Dept: INTERNAL MEDICINE CLINIC | Age: 53
End: 2018-12-20

## 2018-12-20 NOTE — TELEPHONE ENCOUNTER
Patient states she needs a renewal on Letter for her to have permission for Medical Necessity to wear sneakers at work. Please call to advise.  Thank you      Patient has upcoming appt on 1/7/2019

## 2018-12-21 NOTE — TELEPHONE ENCOUNTER
MD Santana Santana LPN   Caller: Unspecified Joie ,  2:41 PM)             Please clarify the medical reason that patient feels she needs to wear sneakers to work and has she had to see a foot specialist?      Left message for patient to return call

## 2018-12-21 NOTE — TELEPHONE ENCOUNTER
Patient states she returning your call. Please call back on her cell number, [phone].  Thank you

## 2018-12-21 NOTE — TELEPHONE ENCOUNTER
Spoke with patient. Two pt identifiers confirmed. Patient states that the original note came from a orthopedic doctor that she use to see. Advised patient that if she needs her letter to wear tennis shoes to work renewed she will need to get that from the specialist.  Pt verbalized understanding of information discussed w/ no further questions at this time.

## 2018-12-27 ENCOUNTER — APPOINTMENT (OUTPATIENT)
Dept: INTERNAL MEDICINE CLINIC | Age: 53
End: 2018-12-27

## 2018-12-27 DIAGNOSIS — I10 BENIGN HYPERTENSION: ICD-10-CM

## 2018-12-27 DIAGNOSIS — E55.9 VITAMIN D DEFICIENCY: Primary | ICD-10-CM

## 2018-12-27 DIAGNOSIS — E11.9 DIABETES MELLITUS WITHOUT COMPLICATION (HCC): ICD-10-CM

## 2018-12-27 DIAGNOSIS — E03.9 ACQUIRED HYPOTHYROIDISM: ICD-10-CM

## 2018-12-27 DIAGNOSIS — E78.00 PURE HYPERCHOLESTEROLEMIA: ICD-10-CM

## 2018-12-28 LAB
25(OH)D3+25(OH)D2 SERPL-MCNC: 12.9 NG/ML (ref 30–100)
ALBUMIN SERPL-MCNC: 4.4 G/DL (ref 3.5–5.5)
ALBUMIN/GLOB SERPL: 1.6 {RATIO} (ref 1.2–2.2)
ALP SERPL-CCNC: 102 IU/L (ref 39–117)
ALT SERPL-CCNC: 42 IU/L (ref 0–32)
APPEARANCE UR: ABNORMAL
AST SERPL-CCNC: 38 IU/L (ref 0–40)
BILIRUB SERPL-MCNC: 0.3 MG/DL (ref 0–1.2)
BILIRUB UR QL STRIP: NEGATIVE
BUN SERPL-MCNC: 10 MG/DL (ref 6–24)
BUN/CREAT SERPL: 17 (ref 9–23)
CALCIUM SERPL-MCNC: 9.5 MG/DL (ref 8.7–10.2)
CHLORIDE SERPL-SCNC: 100 MMOL/L (ref 96–106)
CHOLEST SERPL-MCNC: 203 MG/DL (ref 100–199)
CO2 SERPL-SCNC: 27 MMOL/L (ref 20–29)
COLOR UR: YELLOW
CREAT SERPL-MCNC: 0.6 MG/DL (ref 0.57–1)
ERYTHROCYTE [DISTWIDTH] IN BLOOD BY AUTOMATED COUNT: 16.5 % (ref 12.3–15.4)
GLOBULIN SER CALC-MCNC: 2.8 G/DL (ref 1.5–4.5)
GLUCOSE SERPL-MCNC: 97 MG/DL (ref 65–99)
GLUCOSE UR QL: NEGATIVE
HBA1C MFR BLD: 6.6 % (ref 4.8–5.6)
HCT VFR BLD AUTO: 38.6 % (ref 34–46.6)
HDLC SERPL-MCNC: 49 MG/DL
HGB BLD-MCNC: 12.7 G/DL (ref 11.1–15.9)
HGB UR QL STRIP: NEGATIVE
KETONES UR QL STRIP: NEGATIVE
LDLC SERPL CALC-MCNC: 137 MG/DL (ref 0–99)
LEUKOCYTE ESTERASE UR QL STRIP: NEGATIVE
MCH RBC QN AUTO: 28.1 PG (ref 26.6–33)
MCHC RBC AUTO-ENTMCNC: 32.9 G/DL (ref 31.5–35.7)
MCV RBC AUTO: 85 FL (ref 79–97)
MICRO URNS: ABNORMAL
NITRITE UR QL STRIP: NEGATIVE
PH UR STRIP: 5 [PH] (ref 5–7.5)
PLATELET # BLD AUTO: 338 X10E3/UL (ref 150–379)
POTASSIUM SERPL-SCNC: 3.4 MMOL/L (ref 3.5–5.2)
PROT SERPL-MCNC: 7.2 G/DL (ref 6–8.5)
PROT UR QL STRIP: ABNORMAL
RBC # BLD AUTO: 4.52 X10E6/UL (ref 3.77–5.28)
SODIUM SERPL-SCNC: 144 MMOL/L (ref 134–144)
SP GR UR: >=1.03 (ref 1–1.03)
TRIGL SERPL-MCNC: 87 MG/DL (ref 0–149)
TSH SERPL DL<=0.005 MIU/L-ACNC: 1.6 UIU/ML (ref 0.45–4.5)
UROBILINOGEN UR STRIP-MCNC: 1 MG/DL (ref 0.2–1)
VLDLC SERPL CALC-MCNC: 17 MG/DL (ref 5–40)
WBC # BLD AUTO: 5.7 X10E3/UL (ref 3.4–10.8)

## 2018-12-31 ENCOUNTER — TELEPHONE (OUTPATIENT)
Dept: INTERNAL MEDICINE CLINIC | Age: 53
End: 2018-12-31

## 2018-12-31 NOTE — PROGRESS NOTES
Notify patient cholesterol improving. Diabetes under fair control. Follow low sugar, lower carb diet. Vitamin D is low, Recommend vitamin D loading dose of 50,000 iu twice a week for 12 weeks, once done with loading dose, take 2,000 iu vitamin D3 once a day over the counter to maintain level. Other labs normal.  Follow up as scheduled in January.

## 2019-01-02 RX ORDER — ERGOCALCIFEROL 1.25 MG/1
50000 CAPSULE ORAL
Qty: 24 CAP | Refills: 0 | Status: SHIPPED | OUTPATIENT
Start: 2019-01-03 | End: 2019-03-22 | Stop reason: SDUPTHER

## 2019-01-02 NOTE — PROGRESS NOTES
Spoke with patient. Two pt identifiers confirmed. Patient advised per Dr. Trevor Guallpa that her cholesterol is improving. Diabetes under fair control. Follow low sugar, lower carb diet. Vitamin D is low, Recommend vitamin D loading dose of 50,000 iu twice a week for 12 weeks, once done with loading dose, take 2,000 iu vitamin D3 once a day over the counter to maintain level. Other labs normal.  Follow up as scheduled in January. Pt verbalized understanding of information discussed w/ no further questions at this time.

## 2019-01-07 ENCOUNTER — OFFICE VISIT (OUTPATIENT)
Dept: INTERNAL MEDICINE CLINIC | Age: 54
End: 2019-01-07

## 2019-01-07 VITALS
HEIGHT: 68 IN | HEART RATE: 82 BPM | SYSTOLIC BLOOD PRESSURE: 154 MMHG | TEMPERATURE: 98.9 F | BODY MASS INDEX: 43.35 KG/M2 | WEIGHT: 286 LBS | OXYGEN SATURATION: 97 % | DIASTOLIC BLOOD PRESSURE: 82 MMHG | RESPIRATION RATE: 16 BRPM

## 2019-01-07 DIAGNOSIS — E11.9 CONTROLLED TYPE 2 DIABETES MELLITUS WITHOUT COMPLICATION, WITHOUT LONG-TERM CURRENT USE OF INSULIN (HCC): Primary | ICD-10-CM

## 2019-01-07 DIAGNOSIS — E66.01 MORBID OBESITY WITH BMI OF 40.0-44.9, ADULT (HCC): ICD-10-CM

## 2019-01-07 DIAGNOSIS — E78.00 PURE HYPERCHOLESTEROLEMIA: ICD-10-CM

## 2019-01-07 DIAGNOSIS — I10 HYPERTENSION, UNSPECIFIED TYPE: ICD-10-CM

## 2019-01-07 RX ORDER — DICLOFENAC SODIUM 75 MG/1
75 TABLET, DELAYED RELEASE ORAL 2 TIMES DAILY
COMMUNITY
End: 2020-04-24 | Stop reason: SDUPTHER

## 2019-01-07 NOTE — PATIENT INSTRUCTIONS
Try claritin, allegra, or zyrtec for postnasal drip. Omron wrist cuff. Blood pressure goal is less than 130/85 most of the time. Body Mass Index: Care Instructions Your Care Instructions Body mass index (BMI) can help you see if your weight is raising your risk for health problems. It uses a formula to compare how much you weigh with how tall you are. · A BMI lower than 18.5 is considered underweight. · A BMI between 18.5 and 24.9 is considered healthy. · A BMI between 25 and 29.9 is considered overweight. A BMI of 30 or higher is considered obese. If your BMI is in the normal range, it means that you have a lower risk for weight-related health problems. If your BMI is in the overweight or obese range, you may be at increased risk for weight-related health problems, such as high blood pressure, heart disease, stroke, arthritis or joint pain, and diabetes. If your BMI is in the underweight range, you may be at increased risk for health problems such as fatigue, lower protection (immunity) against illness, muscle loss, bone loss, hair loss, and hormone problems. BMI is just one measure of your risk for weight-related health problems. You may be at higher risk for health problems if you are not active, you eat an unhealthy diet, or you drink too much alcohol or use tobacco products. Follow-up care is a key part of your treatment and safety. Be sure to make and go to all appointments, and call your doctor if you are having problems. It's also a good idea to know your test results and keep a list of the medicines you take. How can you care for yourself at home? · Practice healthy eating habits. This includes eating plenty of fruits, vegetables, whole grains, lean protein, and low-fat dairy. · If your doctor recommends it, get more exercise. Walking is a good choice. Bit by bit, increase the amount you walk every day. Try for at least 30 minutes on most days of the week. · Do not smoke. Smoking can increase your risk for health problems. If you need help quitting, talk to your doctor about stop-smoking programs and medicines. These can increase your chances of quitting for good. · Limit alcohol to 2 drinks a day for men and 1 drink a day for women. Too much alcohol can cause health problems. If you have a BMI higher than 25 · Your doctor may do other tests to check your risk for weight-related health problems. This may include measuring the distance around your waist. A waist measurement of more than 40 inches in men or 35 inches in women can increase the risk of weight-related health problems. · Talk with your doctor about steps you can take to stay healthy or improve your health. You may need to make lifestyle changes to lose weight and stay healthy, such as changing your diet and getting regular exercise. If you have a BMI lower than 18.5 · Your doctor may do other tests to check your risk for health problems. · Talk with your doctor about steps you can take to stay healthy or improve your health. You may need to make lifestyle changes to gain or maintain weight and stay healthy, such as getting more healthy foods in your diet and doing exercises to build muscle. Where can you learn more? Go to http://gem-james.info/. Enter S176 in the search box to learn more about \"Body Mass Index: Care Instructions. \" Current as of: October 13, 2016 Content Version: 11.4 © 0436-6037 [employer]wise, Incorporated. Care instructions adapted under license by [employer] (which disclaims liability or warranty for this information). If you have questions about a medical condition or this instruction, always ask your healthcare professional. [place] [address] any warranty or liability for your use of this information.

## 2019-01-07 NOTE — PROGRESS NOTES
Easton Ruiz is a 48 y.o. female who presents for follow up on medications. Last seen May. Reports sore throat, congestion, PND for a few days. Snoring at night. Off trazodone. Sleep is variable. Taking diclofenac for knee pain, sees DR. Cristo Kathleen.   
  
Taking losartan 50mg once a day and HCTZ 25mg once a day. BP controlled. No dizziness or headaches.   
  
She reports weight gain to 298# per patient and working on weight reducation. Was off diet when working overtime. Better now. Back to the gym. Last HbA1C 6.6% increased from 6.6%. On diabetic diet. No medications for diabetes. Reduced carbs, drinks calorie free. Has not reduced portions.  
   
Starting vitamin D loading dose. Referred for mammogram, to schedule. Had colon screening. Dr. Benitez Moura, June 2018. Normal. Except divert. 10 year follow up. Past Medical History:  
Diagnosis Date  
 HTN (hypertension) 4/6/2010  LAP-BAND surgery status 3/2006  
 band dislodged 5/2006 and not yet replaced  Pure hypercholesterolemia 4/30/2010  Type II or unspecified type diabetes mellitus without mention of complication, not stated as uncontrolled 6/4/2011  
 not currently on medication Family History Problem Relation Age of Onset  No Known Problems Mother  Hypertension Father  No Known Problems Sister Social History Socioeconomic History  Marital status:  Spouse name: Not on file  Number of children: Not on file  Years of education: Not on file  Highest education level: Not on file Social Needs  Financial resource strain: Not on file  Food insecurity - worry: Not on file  Food insecurity - inability: Not on file  Transportation needs - medical: Not on file  Transportation needs - non-medical: Not on file Occupational History  Not on file Tobacco Use  Smoking status: Former Smoker Packs/day: 1.50 Years: 12.00   Pack years: 18.00  
 Types: Cigarettes Last attempt to quit: 1995 Years since quittin.9  Smokeless tobacco: Never Used Substance and Sexual Activity  Alcohol use: No  
 Drug use: No  
 Sexual activity: Yes  
  Partners: Male Birth control/protection: None Other Topics Concern  Not on file Social History Narrative  Not on file Current Outpatient Medications on File Prior to Visit Medication Sig Dispense Refill  diclofenac EC (VOLTAREN) 75 mg EC tablet Take 75 mg by mouth two (2) times a day.  ergocalciferol (ERGOCALCIFEROL) 50,000 unit capsule Take 1 Cap by mouth every Tuesday and Thursday. 24 Cap 0  
 losartan (COZAAR) 50 mg tablet TAKE 1 TABLET BY MOUTH EVERY DAY 30 Tab 0  
 hydroCHLOROthiazide (HYDRODIURIL) 25 mg tablet TAKE 1 TABLET BY MOUTH EVERY DAY 90 Tab 0  
 BIOTIN PO Take  by mouth.  OTHER Tumeric w/joe po  cholecalciferol, vitamin D3, (VITAMIN D3) 2,000 unit tab Take  by mouth. No current facility-administered medications on file prior to visit. Review of Systems Pertinent items are noted in HPI. Objective:  
 
Visit Vitals /82 Pulse 82 Temp 98.9 °F (37.2 °C) (Oral) Resp 16 Ht 5' 8\" (1.727 m) Wt 286 lb (129.7 kg) SpO2 97% BMI 43.49 kg/m² Gen: well appearing female HEENT:   PERRL,normal conjunctiva. External ear and canals normal, TMs no opacification or erythema,  OP no erythema, no exudates, MMM Neck:  Supple. Thyroid normal size, nontender, without nodules. No masses or LAD Resp:  No wheezing, no rhonchi, no rales. CV:  RRR, normal S1S2, no murmur. GI: soft, nontender, without masses. No hepatosplenomegaly. Extrem:  +2 pulses, no edema, warm distally Assessment/Plan: ICD-10-CM ICD-9-CM 1. Controlled type 2 diabetes mellitus without complication, without long-term current use of insulin (HCC) E11.9 250.00   
2. Hypertension, unspecified type I10 401.9 3. Pure hypercholesterolemia E78.00 272.0   
4. Morbid obesity with BMI of 40.0-44.9, adult ([facility]) E66.01 278.01   
 Z68.41 V85.41 Recommend compliance with diabetic diet. Continue medications for diabetes. Monitor blood sugar readings as discussed. Keep up on regular diabetic eye exams. Try claritin, allegra, or zyrtec for postnasal drip. Omron wrist cuff. Blood pressure goal is less than 130/85 most of the time. Follow-up Disposition: 
Return in about 3 months (around 4/7/2019) for follow up on medications. Woody Cunha MD 
 
Discussed the patient's BMI with her. The BMI follow up plan is as follows:  
 
dietary management education, guidance, and counseling 
encourage exercise 
monitor weight 
prescribed dietary intake An After Visit Summary was printed and given to the patient.

## 2019-01-07 NOTE — LETTER
NOTIFICATION RETURN TO WORK / SCHOOL 
 
1/7/2019 3:56 PM 
 
Ms. Rojas Bello [address] [place] [phone] To Whom It May Concern: 
 
Rojas Bello is currently under the care of [facility]. She was seen in the office today for follow up. Please allow her to wear athletic shoes/sneakers to work to provide more support due to her ongoing back pain. If there are questions or concerns please have the patient contact our office.  
 
 
 
Sincerely, 
 
 
Domo Espinoza MD

## 2019-01-07 NOTE — PROGRESS NOTES
Reviewed record in preparation for visit and have obtained necessary documentation. Identified pt with two pt identifiers(name and ). Chief Complaint Patient presents with  Follow-up 6 month- sinus drainage x2 days Health Maintenance Due Topic Date Due  
 Diabetic Foot Care  1975  Albumin Urine Test  1975  Pneumococcal Vaccine (1 of 1 - PPSV23) 1984  
 DTaP/Tdap/Td  (1 - Tdap) 1986  Shingles Vaccine (1 of 2) 2015  Breast Cancer Screening  2015  Stool testing for trace blood  2015  Flu Vaccine  2018 Ms. Ken Sears has a reminder for a \"due or due soon\" health maintenance. I have asked that she discuss this further with her primary care provider for follow-up on this health maintenance. Coordination of Care Questionnaire: 
:  
 
1) Have you been to an emergency room, urgent care clinic since your last visit? no  
Hospitalized since your last visit? no          
 
2) Have you seen or consulted any other health care providers outside of [address] [place] [address] since your last visit? no  (Include any pap smears or colon screenings in this section.) 3) In the event something were to happen to you and you were unable to speak on your behalf, do you have an Advance Directive/ Living Will in place stating your wishes? NO Do you have an Advance Directive on file? no 
 
4) Are you interested in receiving information on Advance Directives? YES Patient is accompanied by self I have received verbal consent from Nick Brown to discuss any/all medical information while they are present in the room.

## 2019-01-11 DIAGNOSIS — I10 ESSENTIAL HYPERTENSION: ICD-10-CM

## 2019-01-11 RX ORDER — HYDROCHLOROTHIAZIDE 25 MG/1
TABLET ORAL
Qty: 90 TAB | Refills: 0 | Status: SHIPPED | OUTPATIENT
Start: 2019-01-11 | End: 2019-04-22 | Stop reason: SDUPTHER

## 2019-01-11 RX ORDER — LOSARTAN POTASSIUM 50 MG/1
TABLET ORAL
Qty: 30 TAB | Refills: 0 | Status: SHIPPED | OUTPATIENT
Start: 2019-01-11 | End: 2019-02-15 | Stop reason: SDUPTHER

## 2019-02-15 DIAGNOSIS — I10 ESSENTIAL HYPERTENSION: ICD-10-CM

## 2019-02-15 RX ORDER — LOSARTAN POTASSIUM 50 MG/1
TABLET ORAL
Qty: 30 TAB | Refills: 0 | Status: SHIPPED | OUTPATIENT
Start: 2019-02-15 | End: 2019-03-22 | Stop reason: SDUPTHER

## 2019-03-22 DIAGNOSIS — I10 ESSENTIAL HYPERTENSION: ICD-10-CM

## 2019-03-22 RX ORDER — ERGOCALCIFEROL 1.25 MG/1
50000 CAPSULE ORAL
Qty: 24 CAP | Refills: 0 | Status: SHIPPED | OUTPATIENT
Start: 2019-03-26 | End: 2021-05-26

## 2019-03-22 RX ORDER — LOSARTAN POTASSIUM 50 MG/1
TABLET ORAL
Qty: 30 TAB | Refills: 0 | Status: SHIPPED | OUTPATIENT
Start: 2019-03-22 | End: 2019-04-22 | Stop reason: SDUPTHER

## 2019-03-22 NOTE — TELEPHONE ENCOUNTER
Requested Prescriptions     Pending Prescriptions Disp Refills    ergocalciferol (ERGOCALCIFEROL) 50,000 unit capsule 24 Cap 0     Sig: Take 1 Cap by mouth every Tuesday and Thursday. PCP: Lizbet Iraheta MD    Last appt: 1/7/2019  Future Appointments   Date Time Provider Jacob Chuyita   4/8/2019  3:15 PM Lizbet Iraheta MD [address]       Requested Prescriptions     Pending Prescriptions Disp Refills    ergocalciferol (ERGOCALCIFEROL) 50,000 unit capsule 24 Cap 0     Sig: Take 1 Cap by mouth every Tuesday and Thursday.

## 2019-04-22 ENCOUNTER — TELEPHONE (OUTPATIENT)
Dept: INTERNAL MEDICINE CLINIC | Age: 54
End: 2019-04-22

## 2019-04-22 DIAGNOSIS — I10 ESSENTIAL HYPERTENSION: ICD-10-CM

## 2019-04-22 RX ORDER — LOSARTAN POTASSIUM 50 MG/1
TABLET ORAL
Qty: 30 TAB | Refills: 0 | Status: SHIPPED | OUTPATIENT
Start: 2019-04-22 | End: 2019-05-31 | Stop reason: SDUPTHER

## 2019-04-22 RX ORDER — HYDROCHLOROTHIAZIDE 25 MG/1
TABLET ORAL
Qty: 90 TAB | Refills: 0 | Status: SHIPPED | OUTPATIENT
Start: 2019-04-22 | End: 2019-07-30 | Stop reason: SDUPTHER

## 2019-04-22 NOTE — TELEPHONE ENCOUNTER
Spoke with patient. Two pt identifiers confirmed. Patient advised to check with her pharmacy to see if her prescription lot # has been affected by the recent recall. Patient advised that if it has been we can change her medication. Pt verbalized understanding of information discussed w/ no further questions at this time.

## 2019-06-27 ENCOUNTER — TELEPHONE (OUTPATIENT)
Dept: INTERNAL MEDICINE CLINIC | Age: 54
End: 2019-06-27

## 2019-06-27 NOTE — LETTER
NOTIFICATION RETURN TO WORK / SCHOOL 
 
6/27/2019 6:08 PM 
 
Ms. Damon Dennis [address] [place] [phone] To Whom It May Concern: 
 
Damon Dennis is currently under the care of [facility]. She has a history of back pain and prior knee surgery with intermittent knee pain. She would benefit from a sit/stand convertible desk at work so she can change positions while working to reduce her pain. If there are questions or concerns please have the patient contact our office.  
 
 
 
Sincerely, 
 
 
Lazarus Rowley MD

## 2019-06-27 NOTE — TELEPHONE ENCOUNTER
#[phone] pt states she needs a letter (you can send through my chart or mail it to her)    She needs a letter to explain the reason why she would need a stand up/sit down desk. She states this is from knee surgery and back pain. She sits from 6 am - 10 pm and when she gets up she can barely walk so she would benefit from a stand up desk she states. Please call with any questions or mail to address on file.

## 2019-07-08 ENCOUNTER — APPOINTMENT (OUTPATIENT)
Dept: GENERAL RADIOLOGY | Age: 54
End: 2019-07-08
Attending: EMERGENCY MEDICINE
Payer: COMMERCIAL

## 2019-07-08 ENCOUNTER — HOSPITAL ENCOUNTER (EMERGENCY)
Age: 54
Discharge: HOME | End: 2019-07-08
Attending: EMERGENCY MEDICINE
Payer: COMMERCIAL

## 2019-07-08 ENCOUNTER — TELEPHONE (OUTPATIENT)
Dept: INTERNAL MEDICINE CLINIC | Age: 54
End: 2019-07-08

## 2019-07-08 VITALS
BODY MASS INDEX: 41.37 KG/M2 | TEMPERATURE: 98.2 F | RESPIRATION RATE: 18 BRPM | HEART RATE: 62 BPM | OXYGEN SATURATION: 96 % | WEIGHT: 273 LBS | DIASTOLIC BLOOD PRESSURE: 75 MMHG | SYSTOLIC BLOOD PRESSURE: 135 MMHG | HEIGHT: 68 IN

## 2019-07-08 DIAGNOSIS — R10.9 ACUTE RIGHT FLANK PAIN: Primary | ICD-10-CM

## 2019-07-08 LAB
APPEARANCE UR: ABNORMAL
BACTERIA URNS QL MICRO: ABNORMAL /HPF
BILIRUB UR QL: NEGATIVE
COLOR UR: ABNORMAL
EPITH CASTS URNS QL MICRO: ABNORMAL /LPF
GLUCOSE UR STRIP.AUTO-MCNC: NEGATIVE MG/DL
HGB UR QL STRIP: NEGATIVE
KETONES UR QL STRIP.AUTO: NEGATIVE MG/DL
LEUKOCYTE ESTERASE UR QL STRIP.AUTO: NEGATIVE
NITRITE UR QL STRIP.AUTO: NEGATIVE
PH UR STRIP: 5 [PH] (ref 5–8)
PROT UR STRIP-MCNC: NEGATIVE MG/DL
RBC #/AREA URNS HPF: ABNORMAL /HPF (ref 0–5)
SP GR UR REFRACTOMETRY: >1.03 (ref 1–1.03)
UROBILINOGEN UR QL STRIP.AUTO: 1 EU/DL (ref 0.2–1)
WBC URNS QL MICRO: ABNORMAL /HPF (ref 0–4)

## 2019-07-08 PROCEDURE — 96372 THER/PROPH/DIAG INJ SC/IM: CPT

## 2019-07-08 PROCEDURE — 74011250637 HC RX REV CODE- 250/637: Performed by: EMERGENCY MEDICINE

## 2019-07-08 PROCEDURE — 74011250636 HC RX REV CODE- 250/636: Performed by: EMERGENCY MEDICINE

## 2019-07-08 PROCEDURE — 99283 EMERGENCY DEPT VISIT LOW MDM: CPT

## 2019-07-08 PROCEDURE — 81001 URINALYSIS AUTO W/SCOPE: CPT

## 2019-07-08 PROCEDURE — 74011000250 HC RX REV CODE- 250: Performed by: EMERGENCY MEDICINE

## 2019-07-08 PROCEDURE — 71101 X-RAY EXAM UNILAT RIBS/CHEST: CPT

## 2019-07-08 RX ORDER — LIDOCAINE 50 MG/G
1 PATCH TOPICAL EVERY 24 HOURS
Status: DISCONTINUED | OUTPATIENT
Start: 2019-07-08 | End: 2019-07-08 | Stop reason: HOSPADM

## 2019-07-08 RX ORDER — ACETAMINOPHEN 500 MG
1000 TABLET ORAL ONCE
Status: COMPLETED | OUTPATIENT
Start: 2019-07-08 | End: 2019-07-08

## 2019-07-08 RX ORDER — KETOROLAC TROMETHAMINE 30 MG/ML
15 INJECTION, SOLUTION INTRAMUSCULAR; INTRAVENOUS ONCE
Status: DISCONTINUED | OUTPATIENT
Start: 2019-07-08 | End: 2019-07-08

## 2019-07-08 RX ORDER — TIZANIDINE 4 MG/1
4 TABLET ORAL
Qty: 30 TAB | Refills: 1 | Status: SHIPPED | OUTPATIENT
Start: 2019-07-08 | End: 2020-04-24 | Stop reason: SDUPTHER

## 2019-07-08 RX ORDER — CYCLOBENZAPRINE HCL 5 MG
5 TABLET ORAL
Status: CANCELLED | OUTPATIENT
Start: 2019-07-08

## 2019-07-08 RX ORDER — KETOROLAC TROMETHAMINE 30 MG/ML
15 INJECTION, SOLUTION INTRAMUSCULAR; INTRAVENOUS
Status: COMPLETED | OUTPATIENT
Start: 2019-07-08 | End: 2019-07-08

## 2019-07-08 RX ORDER — LIDOCAINE 50 MG/G
PATCH TOPICAL
Qty: 5 EACH | Refills: 0 | Status: SHIPPED | OUTPATIENT
Start: 2019-07-08 | End: 2021-05-26

## 2019-07-08 RX ADMIN — ACETAMINOPHEN 1000 MG: 500 TABLET ORAL at 06:43

## 2019-07-08 RX ADMIN — KETOROLAC TROMETHAMINE 15 MG: 30 INJECTION, SOLUTION INTRAMUSCULAR; INTRAVENOUS at 07:19

## 2019-07-08 NOTE — TELEPHONE ENCOUNTER
#[phone] pt states she went to [facility] ED this morning for her back. She states the medication is not working at all. She has such severe pain she can't barely walk. Pt is asking for other medication or anti inflammatory. If not can pt be seen as soon as possible.

## 2019-07-08 NOTE — ED NOTES
Bedside and Verbal shift change report given to Yumiko Damon RN (oncoming nurse) by Lan Schrader RN (offgoing nurse). Report included the following information SBAR, ED Summary, MAR and Recent Results.

## 2019-07-08 NOTE — LETTER
[address] 
[address] [facility] [phone] 
[phone] Work/School Note Date: 7/8/2019 To Whom It May concern: 
 
Marine Linda was seen and treated today in the emergency room by the following provider(s): 
Attending Provider: Sidney Vanegas may return to work on 7/10/19.  
 
Sincerely, 
 
 
 
 
Nguyễn Crook, DO

## 2019-07-08 NOTE — DISCHARGE INSTRUCTIONS
Patient Education        Flank Pain: Care Instructions  Your Care Instructions  Flank pain is pain on the side of the back just below the rib cage and above the waist. It can be on one or both sides. Flank pain has many possible causes, including a kidney stone, a urinary tract infection, or back strain. Flank pain may get better on its own. But don't ignore new symptoms, such as fever, nausea and vomiting, urination problems, pain that gets worse, and dizziness. These may be signs of a more serious problem. You may have to have tests or other treatment. Follow-up care is a key part of your treatment and safety. Be sure to make and go to all appointments, and call your doctor if you are having problems. It's also a good idea to know your test results and keep a list of the medicines you take. How can you care for yourself at home? · Rest until you feel better. · Take pain medicines exactly as directed. ? If the doctor gave you a prescription medicine for pain, take it as prescribed. ? If you are not taking a prescription pain medicine, ask your doctor if you can take an over-the-counter pain medicine, such as acetaminophen (Tylenol), ibuprofen (Advil, Motrin), or naproxen (Aleve). Read and follow all instructions on the label. · If your doctor prescribed antibiotics, take them as directed. Do not stop taking them just because you feel better. You need to take the full course of antibiotics. · To apply heat, put a warm water bottle, a heating pad set on low, or a warm cloth on the painful area. Do not go to sleep with a heating pad on your skin. · To prevent dehydration, drink plenty of fluids, enough so that your urine is light yellow or clear like water. Choose water and other caffeine-free clear liquids until you feel better. If you have kidney, heart, or liver disease and have to limit fluids, talk with your doctor before you increase the amount of fluids you drink. When should you call for help?   Call your doctor now or seek immediate medical care if:    · Your flank pain gets worse.     · You have new symptoms, such as fever, nausea, or vomiting.     · You have symptoms of a urinary problem. For example:  ? You have blood or pus in your urine. ? You have chills or body aches. ? It hurts to urinate. ? You have groin or belly pain.    Watch closely for changes in your health, and be sure to contact your doctor if you do not get better as expected. Where can you learn more? Go to http://gem-james.info/. Enter S191 in the search box to learn more about \"Flank Pain: Care Instructions. \"  Current as of: September 23, 2018  Content Version: 11.9  © 8332-5589 [employer]. Care instructions adapted under license by [employer] (which disclaims liability or warranty for this information). If you have questions about a medical condition or this instruction, always ask your healthcare professional. [place] [address] any warranty or liability for your use of this information.

## 2019-07-08 NOTE — TELEPHONE ENCOUNTER
Pt reports having a couple minor injuries over the last week that twisted mid- upper back. Pain is under bra strap (mid back) & exacerbated with movement. Twisting pain rated 8/10. Pt seen in ED this morning & was given a Toradol injection, which has not provided relief. Pt requesting muscle relaxer or medication to help relieve spasms.      [address]

## 2019-07-08 NOTE — ED TRIAGE NOTES
Ambulatory from home with pain R mid to lower back. Worse with movement. At the beginning of last week grab something suddenly and felt a pain. Yesterday started hurting more and this am when turned to turn off alarm severe pain. Non-radating.

## 2019-07-08 NOTE — ED PROVIDER NOTES
48 y.o. female with past medical history significant for HTN, s/p lap band surgery, and DMT2 who presents from home via private vehicle with chief complaint of back pain. Patient reports 2 days ago, she started with nonradiating mid right-sided back pain exacerbated by movement. She reports taking Tylenol PM that evening with some relief. Yesterday, the pain was somewhat better as she was moving around throughout the day. She took Tylenol PM again last night, however, did not sleep well last night due to increasing pain. She reports this morning, she turned over in bed to turn off her alarm and the pain was severe, prompting her ED visit. She describes the pain as \"twisting my muscles\". She notes the pain seems worse after resting or prolonged sitting. Patient denies any falls or injuries. She denies any abnormal activities. Patient denies any rash, fever, vomiting, abdominal pain, weakness, numbness, saddle anesthesia, bowel or bladder incontinence, difficulty urinating, dysuria, or hematuria. She denies any IVDU. There are no other acute medical concerns at this time. Social hx: Former smoker ([address]); No EtOH use  PCP: Gildardo Galaviz MD    Note written by Aditya Todd, as dictated by Robel Smart DO 6:31 AM    The history is provided by the patient. No  was used.         Past Medical History:   Diagnosis Date    HTN (hypertension) 4/6/2010    LAP-BAND surgery status 3/2006    band dislodged 5/2006 and not yet replaced      Pure hypercholesterolemia 4/30/2010    Type II or unspecified type diabetes mellitus without mention of complication, not stated as uncontrolled 6/4/2011    not currently on medication       Past Surgical History:   Procedure Laterality Date    HX BREAST REDUCTION      HX ORTHOPAEDIC  2015    torn meniscus, left    HX TUBAL LIGATION      LAP, PLACE ADJUST GASTR BAND           Family History:   Problem Relation Age of Onset    No Known Problems Mother     Hypertension Father     No Known Problems Sister        Social History     Socioeconomic History    Marital status:      Spouse name: Not on file    Number of children: Not on file    Years of education: Not on file    Highest education level: Not on file   Occupational History    Not on file   Social Needs    Financial resource strain: Not on file    Food insecurity:     Worry: Not on file     Inability: Not on file    Transportation needs:     Medical: Not on file     Non-medical: Not on file   Tobacco Use    Smoking status: Former Smoker     Packs/day: 1.50     Years: 12.00     Pack years: 18.00     Types: Cigarettes     Last attempt to quit: 1995     Years since quittin.4    Smokeless tobacco: Never Used   Substance and Sexual Activity    Alcohol use: No    Drug use: No    Sexual activity: Yes     Partners: Male     Birth control/protection: None   Lifestyle    Physical activity:     Days per week: Not on file     Minutes per session: Not on file    Stress: Not on file   Relationships    Social connections:     Talks on phone: Not on file     Gets together: Not on file     Attends Buddhism service: Not on file     Active member of club or organization: Not on file     Attends meetings of clubs or organizations: Not on file     Relationship status: Not on file    Intimate partner violence:     Fear of current or ex partner: Not on file     Emotionally abused: Not on file     Physically abused: Not on file     Forced sexual activity: Not on file   Other Topics Concern    Not on file   Social History Narrative    Not on file         ALLERGIES: Patient has no known allergies. Review of Systems   Constitutional: Negative for fever. HENT: Negative for trouble swallowing. Eyes: Negative for visual disturbance. Respiratory: Negative for cough. Cardiovascular: Negative for chest pain. Gastrointestinal: Negative for abdominal pain and vomiting. Genitourinary: Negative for difficulty urinating, dysuria and hematuria. (-) Incontinence    Musculoskeletal: Positive for back pain. Negative for gait problem. Skin: Negative for rash. Neurological: Negative for weakness, numbness and headaches. Hematological: Does not bruise/bleed easily. Psychiatric/Behavioral: Negative for sleep disturbance. All other systems reviewed and are negative. Vitals:    07/08/19 0558   BP: 144/83   Pulse: 81   Resp: 16   Temp: 98.2 °F (36.8 °C)   SpO2: 99%   Weight: 123.8 kg (273 lb)   Height: 5' 8\" (1.727 m)            Physical Exam   Constitutional: She is oriented to person, place, and time. She appears well-developed. HENT:   Head: Normocephalic and atraumatic. Eyes: Conjunctivae are normal.   Neck: Normal range of motion. Cardiovascular: Normal rate and intact distal pulses. Pulmonary/Chest: Effort normal and breath sounds normal. No respiratory distress. She exhibits tenderness. Right posterior lateral rib tenderness to palpation. Abdominal: Soft. Bowel sounds are normal. There is no tenderness. There is no rebound and no guarding. Musculoskeletal: Normal range of motion. Cervical back: She exhibits no bony tenderness. Thoracic back: She exhibits tenderness and pain. She exhibits normal range of motion and no bony tenderness. Lumbar back: She exhibits no bony tenderness. No bony spinal tenderness. Neurological: She is alert and oriented to person, place, and time. Skin: Skin is warm and dry. No bruising and no rash noted. No rashes. No bruising. Psychiatric: Her behavior is normal.   Nursing note and vitals reviewed. Note written by Aditya Phillips, as dictated by Ofelia Paris DO 6:31 AM    MDM  Number of Diagnoses or Management Options  Acute right flank pain:   Diagnosis management comments: No hx concerning for pyelo, but worthwhile to rule out.   Doesn't sound like stone and no blood in urine, very unlikely  Seems musculoskeletal, will get plain film and give pain med  Advised to get further evaluation if pain worsens or if it lingers more than a week. abd soft and benign, don't feel CT would help today. Could be considered if pain continues. No midline back pain to raise concern for SEA, CE, etc. No evidence of shingles, but I warned her to look for it. Plan for continued NSAIDs, ice, then heat. Also, topical lidoderm. Procedures    8:25 AM  UA negative for infection, X-Rays negative for fx. Patient reports some relief from Toradol, Tylenol, and lidocaine patch. Discussed results with the patient, who is agreeable for discharge home with PCP follow up and Lidoderm patches. 8:36 AM  Patient's results have been reviewed with them. Patient and/or family have verbally conveyed their understanding and agreement of the patient's signs, symptoms, diagnosis, treatment and prognosis and additionally agree to follow up as recommended or return to the Emergency Room should their condition change prior to follow-up. Discharge instructions have also been provided to the patient with some educational information regarding their diagnosis as well a list of reasons why they would want to return to the ER prior to their follow-up appointment should their condition change.

## 2019-07-30 RX ORDER — HYDROCHLOROTHIAZIDE 25 MG/1
TABLET ORAL
Qty: 90 TAB | Refills: 0 | Status: SHIPPED | OUTPATIENT
Start: 2019-07-30 | End: 2019-10-14 | Stop reason: SDUPTHER

## 2019-07-30 NOTE — TELEPHONE ENCOUNTER
Patient states she would like to request immediate approval on her refill request today please as she would like to  when she gets off at 2 pm. Patient was reminded of [address] Hr turn around.  Thank you

## 2019-10-14 DIAGNOSIS — I10 ESSENTIAL HYPERTENSION: ICD-10-CM

## 2019-10-14 NOTE — TELEPHONE ENCOUNTER
Dr. Xavier Montalvo   Received: Today   Message Contents   Donna Robison, [facility] Office Pool             Medication Refill     Caller (if not patient):       Relationship of caller (if not patient):       Best contact number(s): [phone]       Name of medication and dosage if known: Losartan 50mg Hydrochlorothiazide 25mg       Is patient out of this medication (yes/no): yes       Pharmacy name: [facility] listed in chart? (yes/no): Yes( a while ago)   Pharmacy phone number: [phone]       Details to clarify the request: Pt needs Rx refills on Losartan 50mg and Hydrochlorothiazide 25mg called into [facility]. Pt is completely out of Hydrochlorothiazide.

## 2019-10-15 RX ORDER — HYDROCHLOROTHIAZIDE 25 MG/1
TABLET ORAL
Qty: 90 TAB | Refills: 0 | Status: SHIPPED | OUTPATIENT
Start: 2019-10-15 | End: 2020-01-28 | Stop reason: SDUPTHER

## 2019-10-15 RX ORDER — LOSARTAN POTASSIUM 50 MG/1
TABLET ORAL
Qty: 90 TAB | Refills: 0 | Status: SHIPPED | OUTPATIENT
Start: 2019-10-15 | End: 2020-01-28 | Stop reason: SDUPTHER

## 2019-10-17 ENCOUNTER — TELEPHONE (OUTPATIENT)
Dept: INTERNAL MEDICINE CLINIC | Age: 54
End: 2019-10-17

## 2019-10-17 NOTE — TELEPHONE ENCOUNTER
LVM for patient on home and cell numbers listed in the chart to return call to the office. Pt needs appointment for annual exam in January.

## 2019-10-18 NOTE — TELEPHONE ENCOUNTER
Spoke with patient. Two pt identifiers confirmed. Patient advised that she is due for her annual exam in January. Patient offered an appointment with Dr. Adán Luna on 1/29/19. Appointment accepted. Patient advised if anything changes or if unable to keep this appointment to call the office  Pt verbalized understanding of information discussed w/ no further questions at this time.

## 2020-01-28 DIAGNOSIS — I10 ESSENTIAL HYPERTENSION: ICD-10-CM

## 2020-01-28 RX ORDER — HYDROCHLOROTHIAZIDE 25 MG/1
TABLET ORAL
Qty: 90 TAB | Refills: 0 | Status: SHIPPED | OUTPATIENT
Start: 2020-01-28 | End: 2020-04-24 | Stop reason: SDUPTHER

## 2020-01-28 RX ORDER — LOSARTAN POTASSIUM 50 MG/1
TABLET ORAL
Qty: 90 TAB | Refills: 0 | Status: SHIPPED | OUTPATIENT
Start: 2020-01-28 | End: 2020-04-24 | Stop reason: SDUPTHER

## 2020-04-24 ENCOUNTER — TELEPHONE (OUTPATIENT)
Dept: INTERNAL MEDICINE CLINIC | Age: 55
End: 2020-04-24

## 2020-04-24 ENCOUNTER — VIRTUAL VISIT (OUTPATIENT)
Dept: INTERNAL MEDICINE CLINIC | Age: 55
End: 2020-04-24

## 2020-04-24 DIAGNOSIS — E78.00 PURE HYPERCHOLESTEROLEMIA: ICD-10-CM

## 2020-04-24 DIAGNOSIS — M54.50 ACUTE BILATERAL LOW BACK PAIN WITHOUT SCIATICA: ICD-10-CM

## 2020-04-24 DIAGNOSIS — E55.9 VITAMIN D DEFICIENCY: ICD-10-CM

## 2020-04-24 DIAGNOSIS — M17.11 PRIMARY OSTEOARTHRITIS OF RIGHT KNEE: ICD-10-CM

## 2020-04-24 DIAGNOSIS — Z12.39 SCREENING FOR BREAST CANCER: ICD-10-CM

## 2020-04-24 DIAGNOSIS — Z00.00 ROUTINE MEDICAL EXAM: Primary | ICD-10-CM

## 2020-04-24 DIAGNOSIS — I10 ESSENTIAL HYPERTENSION: ICD-10-CM

## 2020-04-24 DIAGNOSIS — E11.9 CONTROLLED TYPE 2 DIABETES MELLITUS WITHOUT COMPLICATION, WITHOUT LONG-TERM CURRENT USE OF INSULIN (HCC): ICD-10-CM

## 2020-04-24 DIAGNOSIS — E66.01 MORBID OBESITY WITH BMI OF 40.0-44.9, ADULT (HCC): ICD-10-CM

## 2020-04-24 RX ORDER — LOSARTAN POTASSIUM 50 MG/1
TABLET ORAL
Qty: 30 TAB | Refills: 5 | Status: SHIPPED | OUTPATIENT
Start: 2020-04-24 | End: 2020-12-17

## 2020-04-24 RX ORDER — TIZANIDINE 4 MG/1
4 TABLET ORAL
Qty: 20 TAB | Refills: 1 | Status: SHIPPED | OUTPATIENT
Start: 2020-04-24 | End: 2021-05-26

## 2020-04-24 RX ORDER — HYDROCHLOROTHIAZIDE 25 MG/1
TABLET ORAL
Qty: 30 TAB | Refills: 5 | Status: SHIPPED | OUTPATIENT
Start: 2020-04-24 | End: 2020-12-17

## 2020-04-24 RX ORDER — DICLOFENAC SODIUM 75 MG/1
75 TABLET, DELAYED RELEASE ORAL 2 TIMES DAILY
Qty: 60 TAB | Refills: 3 | Status: SHIPPED | OUTPATIENT
Start: 2020-04-24 | End: 2021-05-26

## 2020-04-24 NOTE — TELEPHONE ENCOUNTER
Jaden Robison MD 29 minutes ago (10:34 AM)         Please mail lab and mammogram info         Documentation        Lab order placed in mailed.

## 2020-04-24 NOTE — PROGRESS NOTES
Daniella Choi is a 47 y.o. female who presents for follow up. Is not able to exercise as much now. Sedentary at work. Taking diclofenac for knee pain, sees Dr. Tien Javed. and saw Dr. Christian Townsend. Will occasionally take tizanidine for back pain.       Taking losartan 50mg once a day and HCTZ 25mg once a day. BP controlled.  No dizziness or headaches.       Weight  273# , now 269#.       Last HbA1C 6.6% increased from 6.6%. On diabetic diet. No medications for diabetes. Reduced carbs, drinks calorie free. Has not reduced portions.       Starting vitamin D loading dose.       Referred for mammogram, to schedule. Had colon screening. Dr. Derek Keenan, June 2018. Normal. Except divert. 10 year follow up. This is an established visit conducted via telemedicine with video. The patient has been instructed that this meets HIPAA criteria and acknowledges and agrees to this method of visitation. Pursuant to the emergency declaration under the [facility], [facility] waiver authority and the [employer] Act, this Virtual Visit was conducted, with patient's consent, to reduce the patient's risk of exposure to COVID-19 and provide continuity of care for an established patient. Services were provided through a video synchronous discussion virtually to substitute for in-person clinic visit.         Past Medical History:   Diagnosis Date    HTN (hypertension) 4/6/2010    LAP-BAND surgery status 3/2006    band dislodged 5/2006 and not yet replaced      Pure hypercholesterolemia 4/30/2010    Type II or unspecified type diabetes mellitus without mention of complication, not stated as uncontrolled 6/4/2011    not currently on medication       Family History   Problem Relation Age of Onset    No Known Problems Mother     Hypertension Father     No Known Problems Sister        Social History     Socioeconomic History    Marital status:      Spouse name: Not on file    Number of children: Not on file    Years of education: Not on file    Highest education level: Not on file   Occupational History    Not on file   Social Needs    Financial resource strain: Not on file    Food insecurity     Worry: Not on file     Inability: Not on file    Transportation needs     Medical: Not on file     Non-medical: Not on file   Tobacco Use    Smoking status: Former Smoker     Packs/day: 1.50     Years: 12.00     Pack years: 18.00     Types: Cigarettes     Last attempt to quit: 1995     Years since quittin.2    Smokeless tobacco: Never Used   Substance and Sexual Activity    Alcohol use: No    Drug use: No    Sexual activity: Yes     Partners: Male     Birth control/protection: None   Lifestyle    Physical activity     Days per week: Not on file     Minutes per session: Not on file    Stress: Not on file   Relationships    Social connections     Talks on phone: Not on file     Gets together: Not on file     Attends Christian service: Not on file     Active member of club or organization: Not on file     Attends meetings of clubs or organizations: Not on file     Relationship status: Not on file    Intimate partner violence     Fear of current or ex partner: Not on file     Emotionally abused: Not on file     Physically abused: Not on file     Forced sexual activity: Not on file   Other Topics Concern    Not on file   Social History Narrative    Not on file       Current Outpatient Medications on File Prior to Visit   Medication Sig Dispense Refill    lidocaine (LIDODERM) 5 % Apply patch to the affected area for 12 hours a day and remove for 12 hours a day. 5 Each 0    ergocalciferol (ERGOCALCIFEROL) 50,000 unit capsule Take 1 Cap by mouth every Tuesday and Thursday. 24 Cap 0    cholecalciferol, vitamin D3, (VITAMIN D3) 2,000 unit tab Take  by mouth.       [DISCONTINUED] losartan (COZAAR) 50 mg tablet TAKE 1 TABLET BY MOUTH EVERY DAY 90 Tab 0    [DISCONTINUED] hydroCHLOROthiazide (HYDRODIURIL) 25 mg tablet TAKE 1 TABLET BY MOUTH EVERY DAY 90 Tab 0    [DISCONTINUED] tiZANidine (ZANAFLEX) 4 mg tablet Take 1 Tab by mouth three (3) times daily as needed (muscle spasm). 30 Tab 1    [DISCONTINUED] diclofenac EC (VOLTAREN) 75 mg EC tablet Take 75 mg by mouth two (2) times a day.  OTHER Tumeric w/ginger po      BIOTIN PO Take  by mouth. No current facility-administered medications on file prior to visit. Review of Systems  Pertinent items are noted in HPI. Objective:     Gen: well appearing female  HEENT: normal conjunctiva, no audible congestion, patient does not see oral erythema, has MMM  Neck: patient does not feel enlarged or tender LAD or masses  Resp: normal respiratory effort, no audible wheezing. CV: patient does not feel palpitations or heart irregularity  Abd: patient does not feel abdominal tenderness or mass, patient does not notice distension  Extrem: patient does not see swelling in ankles or joints. Neuro: Alert and oriented, able to answer questions without difficulty, able to move all extremities and walk normally        Assessment/Plan:       ICD-10-CM ICD-9-CM    1. Routine medical exam N55.45 K40.2 METABOLIC PANEL, COMPREHENSIVE      CBC W/O DIFF      HEMOGLOBIN A1C W/O EAG      LIPID PANEL      VITAMIN D, 25 HYDROXY      URINALYSIS W/ RFLX MICROSCOPIC      TSH 3RD GENERATION   2. Essential hypertension I10 401.9 hydroCHLOROthiazide (HYDRODIURIL) 25 mg tablet      losartan (COZAAR) 50 mg tablet   3. Controlled type 2 diabetes mellitus without complication, without long-term current use of insulin (HCC) E11.9 250.00    4. Pure hypercholesterolemia E78.00 272.0    5. Morbid obesity with BMI of 40.0-44.9, adult ([facility] E66.01 278.01     Z68.41 V85.41    6. Primary osteoarthritis of right knee M17.11 715.16 diclofenac EC (VOLTAREN) 75 mg EC tablet   7.  Acute bilateral low back pain without sciatica M54.5 724.2 tiZANidine (ZANAFLEX) 4 mg tablet     338.19    8. Vitamin D deficiency E55.9 268.9 VITAMIN D, 25 HYDROXY   9. Screening for breast cancer Z12.39 V76.10 ALBERT MAMMO BI SCREENING INCL CAD       This was a telemedicine visit with video. Amanda Russell MD    Follow-up and Dispositions    · Return in about 6 months (around 10/24/2020).

## 2020-07-20 ENCOUNTER — TELEPHONE (OUTPATIENT)
Dept: INTERNAL MEDICINE CLINIC | Age: 55
End: 2020-07-20

## 2020-07-20 DIAGNOSIS — M54.9 ACUTE BACK PAIN, UNSPECIFIED BACK LOCATION, UNSPECIFIED BACK PAIN LATERALITY: Primary | ICD-10-CM

## 2020-07-20 RX ORDER — METHYLPREDNISOLONE 4 MG/1
4 TABLET ORAL
Qty: 1 DOSE PACK | Refills: 0 | Status: SHIPPED | OUTPATIENT
Start: 2020-07-20 | End: 2021-05-26

## 2020-07-20 RX ORDER — METHOCARBAMOL 500 MG/1
500 TABLET, FILM COATED ORAL 3 TIMES DAILY
Qty: 15 TAB | Refills: 0 | Status: SHIPPED | OUTPATIENT
Start: 2020-07-20

## 2020-07-20 NOTE — TELEPHONE ENCOUNTER
Spoke with patient. Two pt identifiers confirmed. Patient states that she was moving some boxes at work a few days ago and is now having a lot of muscle spasms in her back. Patient states that she has tried heat, ice, OTC lidocaine patches and her prescription tizanidine, but none of these are helping. Patient states that she is ok when she is up moving around, but as soon as she sits done the muscles in her back start to get tight and start to spasm. Advised patient that I will check with Dr. Terral Cowden to see what she recommends and will give her a call back as soon as I can. Pt verbalized understanding of information discussed w/ no further questions at this time.

## 2020-07-20 NOTE — LETTER
7/22/2020 9:59 AM 
 
Ms. Zhou Kumar [address] [place] [id number] To Whom It May Concern: 
 
Zhou Kumar is currently under the care of [facility]. On July 20,  she called with report of back pain after lifting boxes at work. She was given medication to treat muscle spasm for the reported on the job injury. She was not seen in the office or by a virtual visit. She was not taken out of work. She is advised to consult with her employer if she feels she has had an on the job injury. If there are questions or concerns please have the patient contact our office.  
 
 
 
Sincerely, 
 
 
Yvonne Aragon MD

## 2020-07-20 NOTE — TELEPHONE ENCOUNTER
#[phone] pt states she needs a letter releasing her to return to work. She needs this due to taking the muscle relaxer's and dose pac. Pt would like this emailed to her as she has no other way to get to her employer. [email or web address]       Please call with any questions/problems.

## 2020-07-20 NOTE — TELEPHONE ENCOUNTER
#[phone]  Pt states she has had very painful muscle spasms in her back. She has done heat & ice with muscle spasms medication. Heat helps the best.  Medication is not working. Pt moved a box at work and this started about 3 days later. Pt states she can walk with no problem, but the minute she sits down it tenses right up. Please call to advise as soon as possible.

## 2020-07-20 NOTE — TELEPHONE ENCOUNTER
MD Iman Martin LPN    Caller: Unspecified (Today,  8:01 AM)               Steroid course and robaxin sent to pharmacy. Stop tizanidine. Follow up by virtual visit if not improving. Spoke with patient. Two pt identifiers confirmed. Patient advised of the above information from Dr. Dylon Mayers. Pt verbalized understanding of information discussed w/ no further questions at this time.

## 2020-07-21 NOTE — TELEPHONE ENCOUNTER
Caller's first and last name: n/a   Callback required yes/no and why: yes   Best contact number(s): [phone]   Details to clarify the request: She sent a message yesterday regarding release paperwork for Dr. Taco Armendariz to fill out so she can get back to lifting 10-20 lbs at work after getting hurt on the job. She gave her email address for those forms to be sent, but if it is better to fax that paperwork, please fax that to her sister, Cristopher Farley, at [phone].

## 2020-07-21 NOTE — TELEPHONE ENCOUNTER
Mar Mireles MD  You 19 hours ago (4:25 PM)       I did not take her out of work, so cannot write a letter to release her to work. I can write a letter stating that she has sedating medications and should not take them while she is at work. Cannot excuse work absence that I did not recommend.        Left message for patient to return call

## 2020-07-22 NOTE — TELEPHONE ENCOUNTER
Spoke with patient. Two pt identifiers confirmed. Patient advised that her letter is ready. Patient asked that the letter be faxed to her sister Margarita Sylvester at [phone].   Letter has been faxed and confirmation received

## 2020-07-22 NOTE — TELEPHONE ENCOUNTER
Spoke with patient. Two pt identifiers confirmed. Patient states that she has to lift heavy boxes for work and could not work while having the muscle spasms in her back. Patient states that her employer will not allow her to return to work without a note for her doctor stating that she was being treated for muscle spasms in her back and that she is able to return to work and if she should have any restrictions. Advised patient that I will send her request to Dr. Myrtle Lara and will give her a call back as soon as she responds. Pt verbalized understanding of information discussed w/ no further questions at this time.

## 2020-07-22 NOTE — TELEPHONE ENCOUNTER
Advise patient that I have written a letter that states: On July 20 she was given medication to treat muscle spasm for a reported on the job injury. She was not seen in office or by virtual visit. She was not taken out of work. She is advised to consult with her employer if she feels she has had an on the job injury.

## 2020-12-17 DIAGNOSIS — I10 ESSENTIAL HYPERTENSION: ICD-10-CM

## 2020-12-17 RX ORDER — LOSARTAN POTASSIUM 50 MG/1
TABLET ORAL
Qty: 30 TAB | Refills: 5 | Status: SHIPPED | OUTPATIENT
Start: 2020-12-17 | End: 2021-05-26 | Stop reason: SDUPTHER

## 2020-12-17 RX ORDER — HYDROCHLOROTHIAZIDE 25 MG/1
TABLET ORAL
Qty: 30 TAB | Refills: 5 | Status: SHIPPED | OUTPATIENT
Start: 2020-12-17 | End: 2021-05-26 | Stop reason: SDUPTHER

## 2021-01-12 ENCOUNTER — TELEPHONE (OUTPATIENT)
Dept: INTERNAL MEDICINE CLINIC | Age: 56
End: 2021-01-12

## 2021-01-12 RX ORDER — CEFUROXIME AXETIL 500 MG/1
500 TABLET ORAL 2 TIMES DAILY
Qty: 20 TAB | Refills: 0 | Status: SHIPPED | OUTPATIENT
Start: 2021-01-12 | End: 2021-05-26

## 2021-01-12 NOTE — TELEPHONE ENCOUNTER
Notify patient I have sent in an antibiotic for possible sinus infection and she can add Flonase 2 sprays each nostril once a day over-the-counter.

## 2021-01-12 NOTE — TELEPHONE ENCOUNTER
----- Message from Junior Perez sent at 1/12/2021  2:29 PM EST -----  Regarding: Yeyo/ refill  Medication Refill    Caller (if not patient): N/A      Relationship of caller (if not patient): N/A      Best contact number(s): [phone]      Name of medication and dosage if known: Rx for Yeast infection       Is patient out of this medication (yes/no): yes      Ph[facility]acy [facility]: [facility] [address] listed in chart? (yes/no):yes  Pharmacy phone number: [phone]      Details to clarify the request: Pt is requesting a Rx for yeast infection to be called to the pharmcy on file.  Pt advised a rx for antibiotic has already been called and she gets a yeast infection when she take an antibiotic      Message from [facility]

## 2021-01-12 NOTE — TELEPHONE ENCOUNTER
Spoke with patient. Two pt identifiers confirmed. Patient states that she has a headache, pressure head congestion, post nasal drip, occassional cough. Patient denies fever. Patient states that she is COVID tested every Wednesday and every test has been negative. Patient is asking if a prescription can be sent to her pharmacy for a sinus infection. Advised patient that I will check with Dr. Tiesha Pedraza and will give her a call back as soon as she responds. Pt verbalized understanding of information discussed w/ no further questions at this time.

## 2021-01-12 NOTE — TELEPHONE ENCOUNTER
Spoke with patient. Two pt identifiers confirmed. Patient advised per Dr. Matthias Chung that a prescription for an antibiotic has been sent to her pharmacy on file. Patient also advised to get OTC flonase and use 2 sprays each nostrill once a day. Pt verbalized understanding of information discussed w/ no further questions at this time.

## 2021-01-12 NOTE — TELEPHONE ENCOUNTER
#[phone]  Pt states she has a sinus infection and is asking for medication to be called into [email or web address]'s on file. Pt states she gets COVID tested every Wednesday at 4:30 pm all test have been negative. Please call pt with any questions and to let her know what doctor will do. Thanks. Madelin Ring

## 2021-01-13 RX ORDER — FLUCONAZOLE 150 MG/1
TABLET ORAL
Qty: 2 TAB | Refills: 0 | Status: SHIPPED | OUTPATIENT
Start: 2021-01-13 | End: 2021-05-26

## 2021-01-18 ENCOUNTER — TELEPHONE (OUTPATIENT)
Dept: INTERNAL MEDICINE CLINIC | Age: 56
End: 2021-01-18

## 2021-01-18 NOTE — TELEPHONE ENCOUNTER
Patient called in stating she is having side effects on the medication Cefuroxime 500mg, she is experiencing dry mouth, her mouth is \"pastey white\" and when she drinks or eat anything it taste like metal. She would like a call to discuss.

## 2021-01-19 NOTE — TELEPHONE ENCOUNTER
Florencia Fothergill, MD 4 hours ago (8:34 AM)        Patient is reporting symptoms that may or may not be a side effect from the antibiotic. This is not an allergic reaction. If you started the antibiotic on the 12th, which was the day it was prescribed, then she is already taken 7 days and can stop it.  7 days should be enough to treat her sinus infection. If she continues to have nasal congestion recommend the patient stay on Flonase 2 sprays each nostril and can increase to twice a day if necessary        Spoke with patient. Two pt identifiers confirmed. Patient advised of the above message from Dr. Ghislaine Murray. Pt verbalized understanding of information discussed w/ no further questions at this time.

## 2021-01-19 NOTE — TELEPHONE ENCOUNTER
Patient is reporting symptoms that may or may not be a side effect from the antibiotic. This is not an allergic reaction. If you started the antibiotic on the 12th, which was the day it was prescribed, then she is already taken 7 days and can stop it.  7 days should be enough to treat her sinus infection.   If she continues to have nasal congestion recommend the patient stay on Flonase 2 sprays each nostril and can increase to twice a day if necessary

## 2021-01-19 NOTE — TELEPHONE ENCOUNTER
----- Message from Sari Campos sent at 1/19/2021  8:03 AM EST -----  Regarding: /telephone  Contact: [phone]  General Message/Vendor Calls    Caller's first and last name:N/A      Reason for call: She called yesterday regarding a reaction to her medication and never received a call back.       Callback required yes/no and why: yes to discuss reaction      Best contact number(s): [phone]      Details to clarify the request:N/A      Sari Campos     Copy/paste Jason Sic

## 2021-03-18 ENCOUNTER — TELEPHONE (OUTPATIENT)
Dept: INTERNAL MEDICINE CLINIC | Age: 56
End: 2021-03-18

## 2021-03-18 DIAGNOSIS — E55.9 VITAMIN D DEFICIENCY: ICD-10-CM

## 2021-03-18 DIAGNOSIS — E78.00 PURE HYPERCHOLESTEROLEMIA: ICD-10-CM

## 2021-03-18 DIAGNOSIS — E11.9 CONTROLLED TYPE 2 DIABETES MELLITUS WITHOUT COMPLICATION, WITHOUT LONG-TERM CURRENT USE OF INSULIN (HCC): Primary | ICD-10-CM

## 2021-03-18 NOTE — TELEPHONE ENCOUNTER
----- Message -----  From: Edy Jacobo  Sent: 3/18/2021   9:09 AM EDT  To: [facility]  Subject: Dr. Lynn Speak Message/Vendor Calls    Caller's first and last name: N/A      Reason for call: Lab Appt Request      Callback required yes/no and why: Yes/Scheduling       Best contact number(s): [phone]      Details to clarify the request: Requesting lab appt before scheduled follow up on 5/26/21.       Message from [facility]

## 2021-03-19 NOTE — TELEPHONE ENCOUNTER
Called, spoke to pt. Two pt identifiers confirmed. Patient informed lab orders has been placed in mail. Pt verbalized understanding of information discussed w/ no further questions at this time.

## 2021-05-26 ENCOUNTER — OFFICE VISIT (OUTPATIENT)
Dept: INTERNAL MEDICINE CLINIC | Age: 56
End: 2021-05-26
Payer: COMMERCIAL

## 2021-05-26 ENCOUNTER — TELEPHONE (OUTPATIENT)
Dept: INTERNAL MEDICINE CLINIC | Age: 56
End: 2021-05-26

## 2021-05-26 VITALS
SYSTOLIC BLOOD PRESSURE: 147 MMHG | OXYGEN SATURATION: 96 % | BODY MASS INDEX: 38.55 KG/M2 | DIASTOLIC BLOOD PRESSURE: 84 MMHG | HEIGHT: 68 IN | WEIGHT: 254.4 LBS | TEMPERATURE: 98.2 F | RESPIRATION RATE: 19 BRPM | HEART RATE: 80 BPM

## 2021-05-26 DIAGNOSIS — R73.03 PREDIABETES: ICD-10-CM

## 2021-05-26 DIAGNOSIS — Z00.00 ROUTINE MEDICAL EXAM: Primary | ICD-10-CM

## 2021-05-26 DIAGNOSIS — E78.00 PURE HYPERCHOLESTEROLEMIA: ICD-10-CM

## 2021-05-26 DIAGNOSIS — I10 ESSENTIAL HYPERTENSION: ICD-10-CM

## 2021-05-26 DIAGNOSIS — E66.9 CLASS 2 OBESITY WITHOUT SERIOUS COMORBIDITY WITH BODY MASS INDEX (BMI) OF 38.0 TO 38.9 IN ADULT, UNSPECIFIED OBESITY TYPE: ICD-10-CM

## 2021-05-26 PROCEDURE — 99396 PREV VISIT EST AGE 40-64: CPT | Performed by: FAMILY MEDICINE

## 2021-05-26 RX ORDER — HYDROCHLOROTHIAZIDE 25 MG/1
TABLET ORAL
Qty: 90 TABLET | Refills: 3 | Status: SHIPPED | OUTPATIENT
Start: 2021-05-26 | End: 2022-03-30 | Stop reason: SDUPTHER

## 2021-05-26 RX ORDER — LANOLIN ALCOHOL/MO/W.PET/CERES
2 CREAM (GRAM) TOPICAL DAILY
COMMUNITY

## 2021-05-26 RX ORDER — LOSARTAN POTASSIUM 50 MG/1
TABLET ORAL
Qty: 90 TABLET | Refills: 3 | Status: SHIPPED | OUTPATIENT
Start: 2021-05-26 | End: 2021-08-02

## 2021-05-26 RX ORDER — LORATADINE 10 MG/1
10 TABLET ORAL
COMMUNITY

## 2021-05-26 NOTE — PROGRESS NOTES
Marlon Cox is a 54 y.o. female who presents for annual exam.  Reviewed labs. Chronic knee pain. Taking diclofenac for knee pain, sees Dr. Leobardo Augustine and saw Dr. Betsy Palacios.       Will occasionally take robaxin for back pain. sedentary job, but exercising 5-6 days a week, gym.      Treated for hypertension. Taking losartan 50mg once a day and HCTZ 25mg once a day. BP controlled.  No dizziness or headaches.       Weight  289#  in 2018, now 254#. Was down to 236# and regained, but now losing again. Prior lap band procedure       Prediabetic with hemoglobin A1c 5.8% On diabetic diet. No medications for diabetes. . Was on keto diet. Is changing to lower fat diet.       Referred for mammogram, to schedule through gyn.      Had colon screening.  Previously with Dr. Emmett Harmon, 2018. Normal. Except divert.  10 year follow up.      Up to date on eye and dental.              Past Medical History:   Diagnosis Date    HTN (hypertension) 2010    LAP-BAND surgery status 3/2006    band dislodged 2006 and not yet replaced      Pure hypercholesterolemia 2010    Type II or unspecified type diabetes mellitus without mention of complication, not stated as uncontrolled 2011    not currently on medication       Family History   Problem Relation Age of Onset    No Known Problems Mother     Hypertension Father     No Known Problems Sister        Social History     Socioeconomic History    Marital status:      Spouse name: Not on file    Number of children: Not on file    Years of education: Not on file    Highest education level: Not on file   Occupational History    Not on file   Tobacco Use    Smoking status: Former Smoker     Packs/day: 1.50     Years: 12.00     Pack years: 18.00     Types: Cigarettes     Quit date: 1995     Years since quittin.3    Smokeless tobacco: Never Used   Substance and Sexual Activity    Alcohol use: Yes     Comment: rarely ,at holidays    Drug use: No    Sexual activity: Yes     Partners: Male     Birth control/protection: None   Other Topics Concern    Not on file   Social History Narrative    Not on file     Social Determinants of Health     Financial Resource Strain:     Difficulty of Paying Living Expenses:    Food Insecurity:     Worried About Running Out of Food in the Last Year:     [address] in the Last Year:    Transportation Needs:     Lack of Transportation (Medical):  Lack of Transportation (Non-Medical):    Physical Activity:     Days of Exercise per Week:     Minutes of Exercise per Session:    Stress:     Feeling of Stress :    Social Connections:     Frequency of Communication with Friends and Family:     Frequency of Social Gatherings with Friends and Family:     Attends [employer] Services:     Active Member of Clubs or Organizations:     Attends Club or Organization Meetings:     Marital Status:    Intimate Partner Violence:     Fear of Current or Ex-Partner:     Emotionally Abused:     Physically Abused:     Sexually Abused:        Current Outpatient Medications on File Prior to Visit   Medication Sig Dispense Refill    glucosamine-chondroitin ([facility]) 500-400 mg cap Take 2 Capsules by mouth daily.  loratadine (Claritin) 10 mg tablet Take 10 mg by mouth.  methocarbamoL (ROBAXIN) 500 mg tablet Take 1 Tab by mouth three (3) times daily. As needed for muscle spasm 15 Tab 0    cholecalciferol, vitamin D3, (VITAMIN D3) 2,000 unit tab Take  by mouth.  [DISCONTINUED] fluconazole (DIFLUCAN) 150 mg tablet Take one tablet every other day for 2 doses. (Patient not taking: Reported on 5/26/2021) 2 Tab 0    [DISCONTINUED] cefUROXime (CEFTIN) 500 mg tablet Take 1 Tab by mouth two (2) times a day.  (Patient not taking: Reported on 5/26/2021) 20 Tab 0    [DISCONTINUED] hydroCHLOROthiazide (HYDRODIURIL) 25 mg tablet TAKE 1 TABLET BY MOUTH EVERY DAY 30 Tab 5    [DISCONTINUED] losartan (COZAAR) 50 mg tablet TAKE 1 TABLET BY MOUTH EVERY DAY 30 Tab 5    [DISCONTINUED] methylPREDNISolone (MEDROL DOSEPACK) 4 mg tablet Take 1 Tab by mouth Specific Days and Specific Times. (Patient not taking: Reported on 5/26/2021) 1 Dose Pack 0    [DISCONTINUED] diclofenac EC (VOLTAREN) 75 mg EC tablet Take 1 Tab by mouth two (2) times a day. As needed (Patient not taking: Reported on 5/26/2021) 60 Tab 3    [DISCONTINUED] tiZANidine (ZANAFLEX) 4 mg tablet Take 1 Tab by mouth three (3) times daily as needed (muscle spasm). (Patient not taking: Reported on 5/26/2021) 20 Tab 1    [DISCONTINUED] lidocaine (LIDODERM) 5 % Apply patch to the affected area for 12 hours a day and remove for 12 hours a day. (Patient not taking: Reported on 5/26/2021) 5 Each 0    [DISCONTINUED] ergocalciferol (ERGOCALCIFEROL) 50,000 unit capsule Take 1 Cap by mouth every Tuesday and Thursday. (Patient not taking: Reported on 5/26/2021) 24 Cap 0    [DISCONTINUED] OTHER Tumeric w/ginger po (Patient not taking: Reported on 5/26/2021)      BIOTIN PO Take  by mouth. No current facility-administered medications on file prior to visit. Review of Systems  Pertinent items are noted in HPI. Objective:     Visit Vitals  BP (!) 147/84 (BP 1 Location: Left arm, BP Patient Position: Sitting, BP Cuff Size: Adult)   Pulse 80   Temp 98.2 °F (36.8 °C) (Temporal)   Resp 19   Ht 5' 8\" (1.727 m)   Wt 254 lb 6.4 oz (115.4 kg)   SpO2 96%   BMI 38.68 kg/m²     Gen: well appearing female  HEENT:   PERRL,normal conjunctiva. External ear and canals normal, TMs no opacification or erythema,  OP no erythema, no exudates, MMM  Neck:  Supple. Thyroid normal size, nontender, without nodules. No masses or LAD  Resp:  No wheezing, no rhonchi, no rales. CV:  RRR, normal S1S2, no murmur. GI: soft, nontender, without masses. No hepatosplenomegaly. Extrem:  +2 pulses, no edema, warm distally      Assessment/Plan:       ICD-10-CM ICD-9-CM    1.  Routine medical exam  Z00.00 V70.0    2. Essential hypertension  I10 401.9 losartan (COZAAR) 50 mg tablet      hydroCHLOROthiazide (HYDRODIURIL) 25 mg tablet   3. Pure hypercholesterolemia  E78.00 272.0    4. Class 2 obesity without serious comorbidity with body mass index (BMI) of 38.0 to 38.9 in adult, unspecified obesity type  E66.9 278.00     Z68.38 V85.38    5. Prediabetes  R73.03 790.29      Continue weight loss efforts, diet and exercise.   Continue present medication for blood pressure    Cynthia Sousa MD

## 2021-07-26 DIAGNOSIS — M17.11 PRIMARY OSTEOARTHRITIS OF RIGHT KNEE: ICD-10-CM

## 2021-07-26 RX ORDER — DICLOFENAC SODIUM 75 MG/1
TABLET, DELAYED RELEASE ORAL
Qty: 60 TABLET | Refills: 3 | Status: SHIPPED | OUTPATIENT
Start: 2021-07-26 | End: 2022-03-30 | Stop reason: SDUPTHER

## 2021-08-02 DIAGNOSIS — I10 ESSENTIAL HYPERTENSION: ICD-10-CM

## 2021-08-02 RX ORDER — LOSARTAN POTASSIUM 50 MG/1
TABLET ORAL
Qty: 30 TABLET | Refills: 5 | Status: SHIPPED | OUTPATIENT
Start: 2021-08-02 | End: 2022-03-30 | Stop reason: SDUPTHER

## 2021-11-05 ENCOUNTER — TELEPHONE (OUTPATIENT)
Dept: INTERNAL MEDICINE CLINIC | Age: 56
End: 2021-11-05

## 2021-11-05 ENCOUNTER — VIRTUAL VISIT (OUTPATIENT)
Dept: INTERNAL MEDICINE CLINIC | Age: 56
End: 2021-11-05
Payer: COMMERCIAL

## 2021-11-05 DIAGNOSIS — J01.90 ACUTE NON-RECURRENT SINUSITIS, UNSPECIFIED LOCATION: Primary | ICD-10-CM

## 2021-11-05 PROCEDURE — 99213 OFFICE O/P EST LOW 20 MIN: CPT | Performed by: INTERNAL MEDICINE

## 2021-11-05 RX ORDER — AMOXICILLIN AND CLAVULANATE POTASSIUM 875; 125 MG/1; MG/1
1 TABLET, FILM COATED ORAL EVERY 12 HOURS
Qty: 20 TABLET | Refills: 0 | Status: SHIPPED | OUTPATIENT
Start: 2021-11-05 | End: 2021-11-15

## 2021-11-05 RX ORDER — FLUCONAZOLE 150 MG/1
150 TABLET ORAL DAILY
Qty: 1 TABLET | Refills: 0 | Status: SHIPPED | OUTPATIENT
Start: 2021-11-05 | End: 2021-11-06

## 2021-11-05 NOTE — TELEPHONE ENCOUNTER
#[phone] pt states she has a sinus infection on the left side of her face. She has stuffiness, drainage and headache. Pt is asking if she can take Zyzal instead of Claritin OR have Dr. Jasmyne Mcgill call an antibiotic in for her to [facility] on file.     Please call pt to advise

## 2021-11-05 NOTE — PROGRESS NOTES
Ricardo Tobar is a 64 y.o. female who was seen by synchronous (real-time) audio-video technology on 2021 for Sinus Infection        Progress Note         PROGRESS NOTE  Name: Ricardo Tobar   : 1965       ASSESSMENT/ PLAN:     Acute Sinusitis: Rx for augmentin. Symptomatic measures: tylenol, etc.     RTC prn. I have reviewed the patient's medications and risks/side effects/benefits were discussed. Diagnosis(-es) explained to patient and questions answered. Literature provided where appropriate. SUBJECTIVE  Ms. Ricardo Tobar is a patient of Richard Blanco MD and presents today acutely for \"sinus infection. \"     Chief Complaint   Patient presents with    Sinus Infection     She has pain and pressure L side of face. \"I feel draining in my ear and throat. \"  Symptoms started over a week ago. \"I get this every year with the season change. \"       OBJECTIVE  There were no vitals taken for this visit. Gen: Pleasant 64 y.o.  female in NAD.           Objective:     Patient-Reported Vitals 2021   Patient-Reported Weight 258.9 lb        [INSTRUCTIONS:  \"[x]\" Indicates a positive item  \"[]\" Indicates a negative item  -- DELETE ALL ITEMS NOT EXAMINED]    Constitutional: [x] Appears well-developed and well-nourished [x] No apparent distress      [] Abnormal -     Mental status: [x] Alert and awake  [x] Oriented to person/place/time [x] Able to follow commands    [] Abnormal -     Eyes:   EOM    [x]  Normal    [] Abnormal -   Sclera  [x]  Normal    [] Abnormal -          Discharge [x]  None visible   [] Abnormal -     HENT: [x] Normocephalic, atraumatic  [] Abnormal -   [x] Mouth/Throat: Mucous membranes are moist    External Ears [x] Normal  [] Abnormal -    Neck: [x] No visualized mass [] Abnormal -     Pulmonary/Chest: [x] Respiratory effort normal   [x] No visualized signs of difficulty breathing or respiratory distress        [] Abnormal -      Musculoskeletal:   [x] Normal gait with no signs of ataxia         [x] Normal range of motion of neck        [] Abnormal -     Neurological:        [x] No Facial Asymmetry (Cranial nerve 7 motor function) (limited exam due to video visit)          [x] No gaze palsy        [] Abnormal -          Skin:        [x] No significant exanthematous lesions or discoloration noted on facial skin         [] Abnormal -            Psychiatric:       [x] Normal Affect [] Abnormal -       Other pertinent observable physical exam findings:-        We discussed the expected course, resolution and complications of the diagnosis(es) in detail. Medication risks, benefits, costs, interactions, and alternatives were discussed as indicated. I advised her to contact the office if her condition worsens, changes or fails to improve as anticipated. She expressed understanding with the diagnosis(es) and plan. Rajeev Gomez, who was evaluated through a patient-initiated, synchronous (real-time) audio-video encounter, and/or her healthcare decision maker, is aware that it is a billable service, with coverage as determined by her insurance carrier. She provided verbal consent to proceed: YES, and patient identification was verified. It was conducted pursuant to the emergency declaration under the [facility], [address] [place] [address] authority and the [employer] General Act. A caregiver was present when appropriate. Ability to conduct physical exam was limited. I was in office. The patient was at home or otherwise outside the office.       Sanjana Muñoz MD

## 2021-11-05 NOTE — PROGRESS NOTES
Ricardo Tobar is a 64 y.o. female  Chief Complaint   Patient presents with    Sinus Infection     Health Maintenance Due   Topic Date Due    Foot Exam Q1  Never done    Eye Exam Retinal or Dilated  Never done    COVID-19 Vaccine (1) Never done    DTaP/Tdap/Td series (1 - Tdap) Never done    Shingrix Vaccine Age 50> (1 of 2) Never done    Breast Cancer Screen Mammogram  Never done    Flu Vaccine (1) 09/01/2021     There were no vitals taken for this visit.     Patient-Reported Vitals 11/5/2021   Patient-Reported Weight 258.9 lb       Patient Phone Number/Email:  [phone]

## 2022-03-20 PROBLEM — E11.9 CONTROLLED TYPE 2 DIABETES MELLITUS WITHOUT COMPLICATION, WITHOUT LONG-TERM CURRENT USE OF INSULIN (HCC): Status: ACTIVE | Noted: 2017-09-08

## 2022-03-29 NOTE — PROGRESS NOTES
Jenna Yang is a 64 y.o. female who presents presents for follow up. Treated for hypertension. Taking losartan 50mg once a day and HCTZ 25mg once a day. BP controlled.  No dizziness or headaches.      Prediabetic with hemoglobin A1c 5.8% On diabetic diet. No medications for diabetes.      Weight  289#  in . Regained 30# after father ,  Not able to sleep. Was down to 236# in 2021. Less active, not following diet. Now, going to start back gym, job is sedentary. Prior lap band procedure, band not functional.       Chronic knee pain. Taking diclofenac episodically for knee pain, sees Dr. Ravi Snyder and saw Dr. Nathan Beltran.       Will occasionally take robaxin for back pain.  sedentary job, but exercising 5-6 days a week, gym.      To have mammogram.      Had colon screening.  Previously with Dr. Maren Mckoy, 2018. Normal. Except divert.  10 year follow up.       Up to date on eye and dental.           Past Medical History:   Diagnosis Date    HTN (hypertension) 2010    LAP-BAND surgery status 3/2006    band dislodged 2006 and not yet replaced      Pure hypercholesterolemia 2010    Type II or unspecified type diabetes mellitus without mention of complication, not stated as uncontrolled 2011    not currently on medication       Family History   Problem Relation Age of Onset    No Known Problems Mother     Hypertension Father     Dementia Father     No Known Problems Sister        Social History     Socioeconomic History    Marital status:      Spouse name: Not on file    Number of children: Not on file    Years of education: Not on file    Highest education level: Not on file   Occupational History    Not on file   Tobacco Use    Smoking status: Former Smoker     Packs/day: 1.50     Years: 12.00     Pack years: 18.00     Types: Cigarettes     Quit date: 1995     Years since quittin.1    Smokeless tobacco: Never Used   Substance and Sexual Activity    Alcohol use: Yes     Comment: rarely ,at holidays    Drug use: No    Sexual activity: Yes     Partners: Male     Birth control/protection: None   Other Topics Concern    Not on file   Social History Narrative    Not on file     Social Determinants of Health     Financial Resource Strain:     Difficulty of Paying Living Expenses: Not on file   Food Insecurity:     Worried About Running Out of Food in the Last Year: Not on file    Fernando of Food in the Last Year: Not on file   Transportation Needs:     Lack of Transportation (Medical): Not on file    Lack of Transportation (Non-Medical): Not on file   Physical Activity:     Days of Exercise per Week: Not on file    Minutes of Exercise per Session: Not on file   Stress:     Feeling of Stress : Not on file   Social Connections:     Frequency of Communication with Friends and Family: Not on file    Frequency of Social Gatherings with Friends and Family: Not on file    Attends Jewish Services: Not on file    Active Member of [address] [place] [address] or Organizations: Not on file    Attends Club or Organization Meetings: Not on file    Marital Status: Not on file   Intimate Partner Violence:     Fear of Current or Ex-Partner: Not on file    Emotionally Abused: Not on file    Physically Abused: Not on file    Sexually Abused: Not on file   Housing Stability:     Unable to Pay for Housing in the Last Year: Not on file    Number of [place] in the Last Year: Not on file    Unstable Housing in the Last Year: Not on file       Current Outpatient Medications on File Prior to Visit   Medication Sig Dispense Refill    glucosamine-chondroitin ([address] [place] [address]) 500-400 mg cap Take 2 Capsules by mouth daily.  loratadine (Claritin) 10 mg tablet Take 10 mg by mouth.  cholecalciferol, vitamin D3, (VITAMIN D3) 2,000 unit tab Take  by mouth.  BIOTIN PO Take  by mouth.       [DISCONTINUED] naproxen sodium (Aleve) 220 mg tablet Take 220 mg by mouth as needed.  [DISCONTINUED] losartan (COZAAR) 50 mg tablet TAKE 1 TABLET BY MOUTH EVERY DAY 30 Tablet 5    [DISCONTINUED] diclofenac EC (VOLTAREN) 75 mg EC tablet TAKE 1 TABLET BY MOUTH TWICE DAILY AS NEEDED 60 Tablet 3    [DISCONTINUED] hydroCHLOROthiazide (HYDRODIURIL) 25 mg tablet TAKE 1 TABLET BY MOUTH EVERY DAY 90 Tablet 3    methocarbamoL (ROBAXIN) 500 mg tablet Take 1 Tab by mouth three (3) times daily. As needed for muscle spasm (Patient not taking: Reported on 11/5/2021) 15 Tab 0     No current facility-administered medications on file prior to visit. Review of Systems  Pertinent items are noted in HPI. Objective:     Visit Vitals  BP (!) 142/85 (BP 1 Location: Left upper arm, BP Patient Position: Sitting, BP Cuff Size: Large adult)   Pulse 78   Temp 97.9 °F (36.6 °C) (Temporal)   Resp 18   Ht 5' 8\" (1.727 m)   Wt 270 lb (122.5 kg)   SpO2 99%   BMI 41.05 kg/m²     Gen: well appearing female  HEENT:   PERRL,normal conjunctiva. External ear and canals normal, TMs no opacification or erythema,  OP no erythema, no exudates, MMM  Neck:  Supple. Thyroid normal size, nontender, without nodules. No masses or LAD  Resp:  No wheezing, no rhonchi, no rales. CV:  RRR, normal S1S2, no murmur. GI: soft, nontender, without masses. No hepatosplenomegaly. Extrem:  +2 pulses, no edema, warm distally      Assessment/Plan:       ICD-10-CM ICD-9-CM    1. Controlled type 2 diabetes mellitus without complication, without long-term current use of insulin (HCC)  E11.9 250.00    2. Class 2 obesity without serious comorbidity with body mass index (BMI) of 38.0 to 38.9 in adult, unspecified obesity type  E66.9 278.00     Z68.38 V85.38    3. Essential hypertension  I10 401.9 hydroCHLOROthiazide (HYDRODIURIL) 25 mg tablet      losartan (COZAAR) 50 mg tablet   4. Pure hypercholesterolemia  E78.00 272.0    5. Primary osteoarthritis of right knee  M17.11 715.16 diclofenac EC (VOLTAREN) 75 mg EC tablet   6. Psychophysiological insomnia  F51.04 307.42 LORazepam (ATIVAN) 0.5 mg tablet   7. Anxiety  F41.9 300.00 LORazepam (ATIVAN) 0.5 mg tablet   8. Encounter for screening mammogram for malignant neoplasm of breast  Z12.31 V76.12 [facility]ERT MAMMO BI SCREENING INCL CAD     Work on weight loss.         Wyatt Pierre MD

## 2022-03-30 ENCOUNTER — TELEPHONE (OUTPATIENT)
Dept: INTERNAL MEDICINE CLINIC | Age: 57
End: 2022-03-30

## 2022-03-30 ENCOUNTER — OFFICE VISIT (OUTPATIENT)
Dept: INTERNAL MEDICINE CLINIC | Age: 57
End: 2022-03-30
Payer: COMMERCIAL

## 2022-03-30 VITALS
TEMPERATURE: 97.9 F | BODY MASS INDEX: 40.92 KG/M2 | SYSTOLIC BLOOD PRESSURE: 142 MMHG | OXYGEN SATURATION: 99 % | HEART RATE: 78 BPM | RESPIRATION RATE: 18 BRPM | DIASTOLIC BLOOD PRESSURE: 85 MMHG | HEIGHT: 68 IN | WEIGHT: 270 LBS

## 2022-03-30 DIAGNOSIS — E11.9 CONTROLLED TYPE 2 DIABETES MELLITUS WITHOUT COMPLICATION, WITHOUT LONG-TERM CURRENT USE OF INSULIN (HCC): Primary | ICD-10-CM

## 2022-03-30 DIAGNOSIS — M17.11 PRIMARY OSTEOARTHRITIS OF RIGHT KNEE: ICD-10-CM

## 2022-03-30 DIAGNOSIS — E11.9 CONTROLLED TYPE 2 DIABETES MELLITUS WITHOUT COMPLICATION, WITHOUT LONG-TERM CURRENT USE OF INSULIN (HCC): ICD-10-CM

## 2022-03-30 DIAGNOSIS — F51.04 PSYCHOPHYSIOLOGICAL INSOMNIA: ICD-10-CM

## 2022-03-30 DIAGNOSIS — I10 ESSENTIAL HYPERTENSION: ICD-10-CM

## 2022-03-30 DIAGNOSIS — Z00.00 ROUTINE MEDICAL EXAM: Primary | ICD-10-CM

## 2022-03-30 DIAGNOSIS — E66.9 CLASS 2 OBESITY WITHOUT SERIOUS COMORBIDITY WITH BODY MASS INDEX (BMI) OF 38.0 TO 38.9 IN ADULT, UNSPECIFIED OBESITY TYPE: ICD-10-CM

## 2022-03-30 DIAGNOSIS — F41.9 ANXIETY: ICD-10-CM

## 2022-03-30 DIAGNOSIS — E78.00 PURE HYPERCHOLESTEROLEMIA: ICD-10-CM

## 2022-03-30 DIAGNOSIS — E55.9 VITAMIN D DEFICIENCY: ICD-10-CM

## 2022-03-30 DIAGNOSIS — Z12.31 ENCOUNTER FOR SCREENING MAMMOGRAM FOR MALIGNANT NEOPLASM OF BREAST: ICD-10-CM

## 2022-03-30 PROCEDURE — 99214 OFFICE O/P EST MOD 30 MIN: CPT | Performed by: FAMILY MEDICINE

## 2022-03-30 RX ORDER — LOSARTAN POTASSIUM 50 MG/1
50 TABLET ORAL DAILY
Qty: 90 TABLET | Refills: 3 | Status: SHIPPED | OUTPATIENT
Start: 2022-03-30

## 2022-03-30 RX ORDER — NAPROXEN SODIUM 220 MG
220 TABLET ORAL AS NEEDED
COMMUNITY
End: 2022-03-30

## 2022-03-30 RX ORDER — LORAZEPAM 0.5 MG/1
0.5 TABLET ORAL
Qty: 20 TABLET | Refills: 0 | Status: SHIPPED | OUTPATIENT
Start: 2022-03-30

## 2022-03-30 RX ORDER — DICLOFENAC SODIUM 75 MG/1
TABLET, DELAYED RELEASE ORAL
Qty: 180 TABLET | Refills: 1 | Status: SHIPPED | OUTPATIENT
Start: 2022-03-30 | End: 2022-08-16

## 2022-03-30 RX ORDER — HYDROCHLOROTHIAZIDE 25 MG/1
TABLET ORAL
Qty: 90 TABLET | Refills: 3 | Status: SHIPPED | OUTPATIENT
Start: 2022-03-30

## 2022-03-30 NOTE — PROGRESS NOTES
.1. Have you been to the ER, urgent care clinic since your last visit? Hospitalized since your last visit? No    2. Have you seen or consulted any other health care providers outside of the [address] [place] [address] since your last visit? Include any pap smears or colon screening.  No       Em lpn

## 2022-03-30 NOTE — PATIENT INSTRUCTIONS
Sleep hygiene: Basic rules for a good night's sleep  Sleep only as much as you need to feel rested and then get out of bed   Keep a regular sleep schedule   Do not try to sleep unless you feel sleepy   Exercise regularly, preferably at least 4 to 5 hours before bedtime   Avoid caffeinated beverages after lunch   Avoid alcohol near bedtime: no \"night cap\"   Avoid smoking, especially in the evening   Do not go to bed hungry   Make the bedroom environment conducive to sleep   Avoid prolonged use of light-emitting screens before bedtime    Deal with your worries before bedtime

## 2022-06-17 LAB
25(OH)D3+25(OH)D2 SERPL-MCNC: 54.9 NG/ML (ref 30–100)
ALBUMIN SERPL-MCNC: 4.3 G/DL (ref 3.8–4.9)
ALBUMIN/CREAT UR: 4 MG/G CREAT (ref 0–29)
ALBUMIN/GLOB SERPL: 1.7 {RATIO} (ref 1.2–2.2)
ALP SERPL-CCNC: 112 IU/L (ref 44–121)
ALT SERPL-CCNC: 39 IU/L (ref 0–32)
APPEARANCE UR: CLEAR
AST SERPL-CCNC: 21 IU/L (ref 0–40)
BILIRUB SERPL-MCNC: 0.3 MG/DL (ref 0–1.2)
BILIRUB UR QL STRIP: NEGATIVE
BUN SERPL-MCNC: 14 MG/DL (ref 6–24)
BUN/CREAT SERPL: 24 (ref 9–23)
CALCIUM SERPL-MCNC: 9.2 MG/DL (ref 8.7–10.2)
CHLORIDE SERPL-SCNC: 104 MMOL/L (ref 96–106)
CHOLEST SERPL-MCNC: 261 MG/DL (ref 100–199)
CO2 SERPL-SCNC: 25 MMOL/L (ref 20–29)
COLOR UR: YELLOW
CREAT SERPL-MCNC: 0.59 MG/DL (ref 0.57–1)
CREAT UR-MCNC: 195.2 MG/DL
EGFR: 106 ML/MIN/1.73
ERYTHROCYTE [DISTWIDTH] IN BLOOD BY AUTOMATED COUNT: 14.9 % (ref 11.7–15.4)
EST. AVERAGE GLUCOSE BLD GHB EST-MCNC: 134 MG/DL
GLOBULIN SER CALC-MCNC: 2.6 G/DL (ref 1.5–4.5)
GLUCOSE SERPL-MCNC: 108 MG/DL (ref 65–99)
GLUCOSE UR QL STRIP: NEGATIVE
HBA1C MFR BLD: 6.3 % (ref 4.8–5.6)
HCT VFR BLD AUTO: 37.5 % (ref 34–46.6)
HDLC SERPL-MCNC: 77 MG/DL
HGB BLD-MCNC: 12.4 G/DL (ref 11.1–15.9)
HGB UR QL STRIP: NEGATIVE
KETONES UR QL STRIP: NEGATIVE
LDLC SERPL CALC-MCNC: 171 MG/DL (ref 0–99)
LEUKOCYTE ESTERASE UR QL STRIP: NEGATIVE
MCH RBC QN AUTO: 29.2 PG (ref 26.6–33)
MCHC RBC AUTO-ENTMCNC: 33.1 G/DL (ref 31.5–35.7)
MCV RBC AUTO: 88 FL (ref 79–97)
MICRO URNS: ABNORMAL
MICROALBUMIN UR-MCNC: 7.6 UG/ML
NITRITE UR QL STRIP: NEGATIVE
PH UR STRIP: 5.5 [PH] (ref 5–7.5)
PLATELET # BLD AUTO: 277 X10E3/UL (ref 150–450)
POTASSIUM SERPL-SCNC: 3.7 MMOL/L (ref 3.5–5.2)
PROT SERPL-MCNC: 6.9 G/DL (ref 6–8.5)
PROT UR QL STRIP: NEGATIVE
RBC # BLD AUTO: 4.24 X10E6/UL (ref 3.77–5.28)
SODIUM SERPL-SCNC: 144 MMOL/L (ref 134–144)
SP GR UR STRIP: >=1.03 (ref 1–1.03)
TRIGL SERPL-MCNC: 80 MG/DL (ref 0–149)
TSH SERPL DL<=0.005 MIU/L-ACNC: 1.72 UIU/ML (ref 0.45–4.5)
UROBILINOGEN UR STRIP-MCNC: 0.2 MG/DL (ref 0.2–1)
VLDLC SERPL CALC-MCNC: 13 MG/DL (ref 5–40)
WBC # BLD AUTO: 6.7 X10E3/UL (ref 3.4–10.8)

## 2022-06-19 ENCOUNTER — PATIENT MESSAGE (OUTPATIENT)
Dept: INTERNAL MEDICINE CLINIC | Age: 57
End: 2022-06-19

## 2022-08-16 ENCOUNTER — VIRTUAL VISIT (OUTPATIENT)
Dept: INTERNAL MEDICINE CLINIC | Age: 57
End: 2022-08-16
Payer: COMMERCIAL

## 2022-08-16 DIAGNOSIS — E66.01 MORBID OBESITY WITH BMI OF 40.0-44.9, ADULT (HCC): ICD-10-CM

## 2022-08-16 DIAGNOSIS — M25.562 ACUTE PAIN OF LEFT KNEE: Primary | ICD-10-CM

## 2022-08-16 DIAGNOSIS — E11.9 CONTROLLED TYPE 2 DIABETES MELLITUS WITHOUT COMPLICATION, WITHOUT LONG-TERM CURRENT USE OF INSULIN (HCC): ICD-10-CM

## 2022-08-16 DIAGNOSIS — E78.00 PURE HYPERCHOLESTEROLEMIA: ICD-10-CM

## 2022-08-16 DIAGNOSIS — I10 HYPERTENSION, UNSPECIFIED TYPE: ICD-10-CM

## 2022-08-16 PROCEDURE — 99213 OFFICE O/P EST LOW 20 MIN: CPT | Performed by: FAMILY MEDICINE

## 2022-08-16 RX ORDER — MELOXICAM 15 MG/1
15 TABLET ORAL DAILY
Qty: 30 TABLET | Refills: 2 | Status: SHIPPED | OUTPATIENT
Start: 2022-08-16 | End: 2022-10-21

## 2022-08-16 NOTE — PROGRESS NOTES
Lidya Lorenzo is a 64 y.o. female who presents with concern of a cyst behind left knee. Prior torn meniscus left knee, had surgery. Has knee pain with fully bending knee. Reviewed labs from . Hba1C 6.3% . Discussed diet and weight loss. This is an established visit conducted via telemedicine with video. The patient has been instructed that this meets HIPAA criteria and acknowledges and agrees to this method of visitation. Pursuant to the emergency declaration under the [facility], [facility] waiver authority and the [employer] Act, this Virtual Visit was conducted, with patient's consent, to reduce the patient's risk of exposure to COVID-19 and provide continuity of care for an established patient. Services were provided through a video synchronous discussion virtually to substitute for in-person clinic visit.         Past Medical History:   Diagnosis Date    HTN (hypertension) 2010    LAP-BAND surgery status 3/2006    band dislodged 2006 and not yet replaced      Pure hypercholesterolemia 2010    Type II or unspecified type diabetes mellitus without mention of complication, not stated as uncontrolled 2011    not currently on medication       Family History   Problem Relation Age of Onset    No Known Problems Mother     Hypertension Father     Dementia Father     No Known Problems Sister        Social History     Socioeconomic History    Marital status:      Spouse name: Not on file    Number of children: Not on file    Years of education: Not on file    Highest education level: Not on file   Occupational History    Not on file   Tobacco Use    Smoking status: Former     Packs/day: 1.50     Years: 12.00     Pack years: 18.00     Types: Cigarettes     Quit date: 1995     Years since quittin.5    Smokeless tobacco: Never   Substance and Sexual Activity    Alcohol use: Yes     Comment: rarely ,at holidays    Drug use: No    Sexual activity: Yes     Partners: Male     Birth control/protection: None   Other Topics Concern    Not on file   Social History Narrative    Not on file     Social Determinants of Health     Financial Resource Strain: Not on file   Food Insecurity: Not on file   Transportation Needs: Not on file   Physical Activity: Not on file   Stress: Not on file   Social Connections: Not on file   Intimate Partner Violence: Not on file   Housing Stability: Not on file       Current Outpatient Medications on File Prior to Visit   Medication Sig Dispense Refill    diclofenac EC (VOLTAREN) 75 mg EC tablet TAKE 1 TABLET BY MOUTH TWICE DAILY AS NEEDED 180 Tablet 1    LORazepam (ATIVAN) 0.5 mg tablet Take 1 Tablet by mouth nightly as needed for Anxiety (or insomnia). Max Daily Amount: 0.5 mg. 20 Tablet 0    hydroCHLOROthiazide (HYDRODIURIL) 25 mg tablet TAKE 1 TABLET BY MOUTH EVERY DAY 90 Tablet 3    losartan (COZAAR) 50 mg tablet Take 1 Tablet by mouth daily. 90 Tablet 3    glucosamine-chondroitin (ARTHX) 500-400 mg cap Take 2 Capsules by mouth daily. loratadine (CLARITIN) 10 mg tablet Take 10 mg by mouth. methocarbamoL (ROBAXIN) 500 mg tablet Take 1 Tab by mouth three (3) times daily. As needed for muscle spasm 15 Tab 0    cholecalciferol, vitamin D3, 50 mcg (2,000 unit) tab Take  by mouth. BIOTIN PO Take  by mouth. No current facility-administered medications on file prior to visit. Review of Systems  Pertinent items are noted in HPI. Objective:     Gen: well appearing female  Resp: normal respiratory effort, no audible wheezing. CV: patient does not feel palpitations or heart irregularity  Extrem: swelling behind left knee, pain with knee flexion  Neuro: Alert and oriented, able to answer questions without difficulty      Assessment/Plan:       ICD-10-CM ICD-9-CM    1. Acute pain of left knee  M25.562 719.46 REFERRAL TO ORTHOPEDICS      2. Pure hypercholesterolemia  E78.00 272.0       3. Morbid obesity with BMI of 40.0-44.9, adult ([facility]  E66.01 278.01     Z68.41 V85.41       4. Hypertension, unspecified type  I10 401.9       5. Controlled type 2 diabetes mellitus without complication, without long-term current use of insulin (HCC)  E11.9 250.00          Ice, mobic. To see ortho. Work on weight reduction. This was a telemedicine visit with video.         Lesly Espinoza MD

## 2022-08-16 NOTE — PROGRESS NOTES
Haider Horton is a 64 y.o. female  HIPAA verified by two patient identifiers. Health Maintenance Due   Topic    Pneumococcal 0-64 years (1 - PCV)    Foot Exam Q1     Eye Exam Retinal or Dilated     DTaP/Tdap/Td series (1 - Tdap)    Shingrix Vaccine Age 50> (1 of 2)    Breast Cancer Screen Mammogram     COVID-19 Vaccine (4 - Booster for Fawad Plater series)     Chief Complaint   Patient presents with    Cyst     Behind left knee 3/4 months it has gotten bigger hard to walk and bend knee 8/10       Patient-Reported Vitals 8/16/2022   Patient-Reported Weight 272lb       Pain Scale: 8/10  Pain Location: left knee cyst            1. Have you been to the ER, urgent care clinic since your last visit? Hospitalized since your last visit? No    2. Have you seen or consulted any other health care providers outside of the [address] [place] [address] since your last visit? Include any pap smears or colon screening.  No

## 2022-08-17 ENCOUNTER — TELEPHONE (OUTPATIENT)
Dept: INTERNAL MEDICINE CLINIC | Age: 57
End: 2022-08-17

## 2022-08-17 ENCOUNTER — DOCUMENTATION ONLY (OUTPATIENT)
Dept: INTERNAL MEDICINE CLINIC | Age: 57
End: 2022-08-17

## 2022-08-17 DIAGNOSIS — E11.9 CONTROLLED TYPE 2 DIABETES MELLITUS WITHOUT COMPLICATION, WITHOUT LONG-TERM CURRENT USE OF INSULIN (HCC): Primary | ICD-10-CM

## 2022-08-17 NOTE — TELEPHONE ENCOUNTER
Contacted patient and verified using 2 identifiers. Patient has some concerns regarding upcoming appointments. Labs were ordered in March 2022 but not completed till June 2022. Patient stated she is to get lab work done before [place] as she is supposed to have her levels down before then. Patient was seen virtual yesterday for Acute and referred to PT but does not start that until after the 8/29 appointment. Wants to keep the 11/18 appointment to change it to physical and get lab done before that appointment due to having completed PT and then it is closer to [place]. Will discuss with Dr Zaira Post and be back in contact with patient. Advised it will be a few days. Will leave the appointments as they stand.

## 2022-10-13 ENCOUNTER — TELEPHONE (OUTPATIENT)
Dept: INTERNAL MEDICINE CLINIC | Age: 57
End: 2022-10-13

## 2022-10-21 ENCOUNTER — OFFICE VISIT (OUTPATIENT)
Dept: INTERNAL MEDICINE CLINIC | Age: 57
End: 2022-10-21
Payer: COMMERCIAL

## 2022-10-21 VITALS
TEMPERATURE: 97.3 F | RESPIRATION RATE: 16 BRPM | SYSTOLIC BLOOD PRESSURE: 133 MMHG | BODY MASS INDEX: 42.44 KG/M2 | WEIGHT: 280 LBS | HEART RATE: 73 BPM | HEIGHT: 68 IN | DIASTOLIC BLOOD PRESSURE: 74 MMHG | OXYGEN SATURATION: 100 %

## 2022-10-21 DIAGNOSIS — Z23 NEEDS FLU SHOT: ICD-10-CM

## 2022-10-21 DIAGNOSIS — R53.83 FATIGUE, UNSPECIFIED TYPE: Primary | ICD-10-CM

## 2022-10-21 DIAGNOSIS — R60.9 EDEMA, UNSPECIFIED TYPE: ICD-10-CM

## 2022-10-21 DIAGNOSIS — Z23 ENCOUNTER FOR IMMUNIZATION: ICD-10-CM

## 2022-10-21 DIAGNOSIS — I10 ESSENTIAL HYPERTENSION: ICD-10-CM

## 2022-10-21 PROCEDURE — 99214 OFFICE O/P EST MOD 30 MIN: CPT | Performed by: FAMILY MEDICINE

## 2022-10-21 PROCEDURE — 90471 IMMUNIZATION ADMIN: CPT | Performed by: FAMILY MEDICINE

## 2022-10-21 PROCEDURE — 90686 IIV4 VACC NO PRSV 0.5 ML IM: CPT | Performed by: FAMILY MEDICINE

## 2022-10-21 NOTE — PROGRESS NOTES
1. \"Have you been to the ER, urgent care clinic since your last visit? Hospitalized since your last visit? \" No    2. \"Have you seen or consulted any other health care providers outside of the [address] [place] [address] since your last visit? \" Yes [facility]      3. For patients aged 39-70: Has the patient had a colonoscopy / FIT/ Cologuard? Yes - no Care Gap present      If the patient is female:    4. For patients aged 41-77: Has the patient had a mammogram within the past 2 years? Yes - no Care Gap present      5. For patients aged 21-65: Has the patient had a pap smear?  No

## 2022-10-21 NOTE — PROGRESS NOTES
Silvia Ramon is a 62 y.o. female who presents with concern of mood changes. Has a support group, working on weight loss together. Coping with loss. Feeling fatigued. Not sleeping well. Drinking caffeine. Took benzo for sleep. Weight gain 10# since 2022. To start at [facility] for aquatics. Trying to follow diet better. Prior lap band procedure, band not functional.      Treated for hypertension. Taking losartan 50mg once a day and HCTZ 25mg once a day. BP controlled. No dizziness or headaches. swelling in lower legs. Not drinking much water. Prediabetic with hemoglobin A1c 5.8% On diabetic diet. No medications for diabetes. Chronic knee pain. Taking diclofenac and tried mobic.  sees Dr. Klaudia Mukherjee and saw Dr. Ariana Sosa.         Past Medical History:   Diagnosis Date    HTN (hypertension) 2010    LAP-BAND surgery status 3/2006    band dislodged 2006 and not yet replaced      Pure hypercholesterolemia 2010    Type II or unspecified type diabetes mellitus without mention of complication, not stated as uncontrolled 2011    not currently on medication       Family History   Problem Relation Age of Onset    No Known Problems Mother     Hypertension Father     Dementia Father     No Known Problems Sister        Social History     Socioeconomic History    Marital status:      Spouse name: Not on file    Number of children: Not on file    Years of education: Not on file    Highest education level: Not on file   Occupational History    Not on file   Tobacco Use    Smoking status: Former     Packs/day: 1.50     Years: 12.00     Pack years: 18.00     Types: Cigarettes     Quit date: 1995     Years since quittin.7    Smokeless tobacco: Never   Substance and Sexual Activity    Alcohol use: Yes     Comment: rarely ,at holidays    Drug use: No    Sexual activity: Yes     Partners: Male     Birth control/protection: None   Other Topics Concern    Not on file Social History Narrative    Not on file     Social Determinants of Health     Financial Resource Strain: Low Risk     Difficulty of Paying Living Expenses: Not hard at all   Food Insecurity: No Food Insecurity    Worried About Running Out of Food in the Last Year: Never true    Ran Out of Food in the Last Year: Never true   Transportation Needs: Not on file   Physical Activity: Not on file   Stress: Not on file   Social Connections: Not on file   Intimate Partner Violence: Not on file   Housing Stability: Not on file       Current Outpatient Medications on File Prior to Visit   Medication Sig Dispense Refill    LORazepam (ATIVAN) 0.5 mg tablet Take 1 Tablet by mouth nightly as needed for Anxiety (or insomnia). Max Daily Amount: 0.5 mg. 20 Tablet 0    hydroCHLOROthiazide (HYDRODIURIL) 25 mg tablet TAKE 1 TABLET BY MOUTH EVERY DAY 90 Tablet 3    losartan (COZAAR) 50 mg tablet Take 1 Tablet by mouth daily. 90 Tablet 3    glucosamine-chondroitin (ARTHX) 500-400 mg cap Take 2 Capsules by mouth daily. loratadine (CLARITIN) 10 mg tablet Take 10 mg by mouth. methocarbamoL (ROBAXIN) 500 mg tablet Take 1 Tab by mouth three (3) times daily. As needed for muscle spasm 15 Tab 0    cholecalciferol, vitamin D3, 50 mcg (2,000 unit) tab Take  by mouth. BIOTIN PO Take  by mouth. No current facility-administered medications on file prior to visit. Review of Systems  Pertinent items are noted in HPI. Objective:     Visit Vitals  /74   Pulse 73   Temp 97.3 °F (36.3 °C) (Temporal)   Resp 16   Ht 5' 8\" (1.727 m)   Wt 280 lb (127 kg)   SpO2 100%   BMI 42.57 kg/m²     Gen: well appearing female  Resp:  No wheezing, no rhonchi, no rales. CV:  RRR, normal S1S2, no murmur. Extrem:  +2 pulses, +1 pretibial edema, warm distally      Assessment/Plan:       ICD-10-CM ICD-9-CM    1. Fatigue, unspecified type  R53.83 780.79       2. Essential hypertension  I10 401.9       3.  Edema, unspecified type  R60.9 782.3       4. Encounter for immunization  Z23 V03.89 INFLUENZA, FLUARIX, FLULAVAL, FLUZONE (AGE 6 MO+), AFLURIA(AGE 3Y+) IM, PF, 0.5 ML      5. Needs flu shot  Z23 V04.81 INFLUENZA, FLUARIX, FLULAVAL, FLUZONE (AGE 6 MO+), AFLURIA(AGE 3Y+) IM, PF, 0.5 ML        Low salt diet, continue to work on weight reduction. Work on sleep quality. Follow-up and Dispositions    Return for as scheduled.  Reynaldo Breen MD

## 2022-10-25 ENCOUNTER — PATIENT MESSAGE (OUTPATIENT)
Dept: INTERNAL MEDICINE CLINIC | Age: 57
End: 2022-10-25

## 2022-10-25 NOTE — TELEPHONE ENCOUNTER
----- Message from [facility] sent at 10/25/2022 12:11 PM EDT -----  Regarding: FW: Fluid Pill    ----- Message -----  From: Tyrese Bolden  Sent: 10/25/2022  12:10 PM EDT  To: Lazarus [facility] Nurse Pool  Subject: Fluid Pill                                       Good afternoon, per my visit you recommended a prescription for a fluid pill that i could take one time to get rid of the fluid build up in my legs. If possible could you please send that to my pharmacy at  [id number]  located at [place] in [facility]. If you have any questions for me, please feel free to contact me.   Thanks in advance and have a wonderful day

## 2022-10-27 RX ORDER — FUROSEMIDE 20 MG/1
20 TABLET ORAL DAILY
Qty: 30 TABLET | Refills: 2 | Status: SHIPPED | OUTPATIENT
Start: 2022-10-27

## 2022-10-27 RX ORDER — POTASSIUM CHLORIDE 750 MG/1
10 TABLET, EXTENDED RELEASE ORAL DAILY
Qty: 30 TABLET | Refills: 2 | Status: SHIPPED | OUTPATIENT
Start: 2022-10-27

## 2023-02-24 ENCOUNTER — OFFICE VISIT (OUTPATIENT)
Dept: INTERNAL MEDICINE CLINIC | Age: 58
End: 2023-02-24
Payer: COMMERCIAL

## 2023-02-24 VITALS
OXYGEN SATURATION: 98 % | TEMPERATURE: 97.4 F | RESPIRATION RATE: 14 BRPM | WEIGHT: 278 LBS | BODY MASS INDEX: 42.13 KG/M2 | DIASTOLIC BLOOD PRESSURE: 87 MMHG | SYSTOLIC BLOOD PRESSURE: 165 MMHG | HEART RATE: 76 BPM | HEIGHT: 68 IN

## 2023-02-24 DIAGNOSIS — Z00.00 ROUTINE MEDICAL EXAM: Primary | ICD-10-CM

## 2023-02-24 DIAGNOSIS — R73.03 PREDIABETES: ICD-10-CM

## 2023-02-24 DIAGNOSIS — M54.9 ACUTE BACK PAIN, UNSPECIFIED BACK LOCATION, UNSPECIFIED BACK PAIN LATERALITY: ICD-10-CM

## 2023-02-24 DIAGNOSIS — I10 ESSENTIAL HYPERTENSION: ICD-10-CM

## 2023-02-24 DIAGNOSIS — E78.00 PURE HYPERCHOLESTEROLEMIA: ICD-10-CM

## 2023-02-24 RX ORDER — LOSARTAN POTASSIUM 50 MG/1
50 TABLET ORAL DAILY
Qty: 90 TABLET | Refills: 3 | Status: SHIPPED | OUTPATIENT
Start: 2023-02-24

## 2023-02-24 RX ORDER — HYDROCHLOROTHIAZIDE 25 MG/1
TABLET ORAL
Qty: 90 TABLET | Refills: 3 | Status: SHIPPED | OUTPATIENT
Start: 2023-02-24

## 2023-02-24 RX ORDER — METHOCARBAMOL 500 MG/1
500 TABLET, FILM COATED ORAL 3 TIMES DAILY
Qty: 15 TABLET | Refills: 0 | Status: SHIPPED | OUTPATIENT
Start: 2023-02-24

## 2023-02-24 NOTE — PROGRESS NOTES
Debi Quiñones is a 62 y.o. female who presents for follow-up. Patient was seen in October with concern of fatigue and mood changes. She has been working on weight reduction. Trying to follow better diet. Drinking more water. Going to gym, 30 minutes cardio, 30 minutes weights, just started recently. In a [employer] club once a week. Prior lap band procedure with band not functional at this time    Treated for hypertension. Blood pressure elevated today. On losartan and HCTZ. Has lasix and potassium. As needed for swelling. BP at home better. Prediabetes/diabetes  6.3%. 2022. Chronic knee pain. No medications for pain. Has voltarel gel. Followed by Ortho. Taking osteobioflex     Had colon screening. Previously with Dr. Rosi St, 2018. Normal. No family history of colon cancer. Pap 2018 negative except trichomonas. No followed by [facility]. Has mammograms through [facility].     Up to date on eye and dental.        Past Medical History:   Diagnosis Date    HTN (hypertension) 2010    LAP-BAND surgery status 3/2006    band dislodged 2006 and not yet replaced      Pure hypercholesterolemia 2010    Type II or unspecified type diabetes mellitus without mention of complication, not stated as uncontrolled 2011    not currently on medication       Family History   Problem Relation Age of Onset    No Known Problems Mother     Hypertension Father     Dementia Father     No Known Problems Sister        Social History     Socioeconomic History    Marital status:      Spouse name: Not on file    Number of children: Not on file    Years of education: Not on file    Highest education level: Not on file   Occupational History    Not on file   Tobacco Use    Smoking status: Former     Packs/day: 1.50     Years: 12.00     Pack years: 18.00     Types: Cigarettes     Quit date: 1995     Years since quittin.0    Smokeless tobacco: Never Substance and Sexual Activity    Alcohol use: Yes     Comment: rarely ,at holidays    Drug use: No    Sexual activity: Yes     Partners: Male     Birth control/protection: None   Other Topics Concern    Not on file   Social History Narrative    Not on file     Social Determinants of Health     Financial Resource Strain: Low Risk     Difficulty of Paying Living Expenses: Not hard at all   Food Insecurity: No Food Insecurity    Worried About Running Out of Food in the Last Year: Never true    Ran Out of Food in the Last Year: Never true   Transportation Needs: Not on file   Physical Activity: Not on file   Stress: Not on file   Social Connections: Not on file   Intimate Partner Violence: Not on file   Housing Stability: Not on file       Current Outpatient Medications on File Prior to Visit   Medication Sig Dispense Refill    furosemide (LASIX) 20 mg tablet Take 1 Tablet by mouth daily. 30 Tablet 2    potassium chloride (KLOR-CON M10) 10 mEq tablet Take 1 Tablet by mouth daily. 30 Tablet 2    LORazepam (ATIVAN) 0.5 mg tablet Take 1 Tablet by mouth nightly as needed for Anxiety (or insomnia). Max Daily Amount: 0.5 mg. 20 Tablet 0    glucosamine-chondroitin (ARTHX) 500-400 mg cap Take 2 Capsules by mouth daily. loratadine (CLARITIN) 10 mg tablet Take 10 mg by mouth. cholecalciferol, vitamin D3, 50 mcg (2,000 unit) tab Take  by mouth. BIOTIN PO Take  by mouth. [DISCONTINUED] hydroCHLOROthiazide (HYDRODIURIL) 25 mg tablet TAKE 1 TABLET BY MOUTH EVERY DAY 90 Tablet 3    [DISCONTINUED] losartan (COZAAR) 50 mg tablet Take 1 Tablet by mouth daily. 90 Tablet 3    [DISCONTINUED] methocarbamoL (ROBAXIN) 500 mg tablet Take 1 Tab by mouth three (3) times daily. As needed for muscle spasm 15 Tab 0     No current facility-administered medications on file prior to visit. Review of Systems  Pertinent items are noted in HPI.     Objective:     Visit Vitals  BP (!) 165/87   Pulse 76   Temp 97.4 °F (36.3 °C) (Temporal)   Resp 14   Ht 5' 8\" (1.727 m)   Wt 278 lb (126.1 kg)   SpO2 98%   BMI 42.27 kg/m²     Gen: well appearing female  HEENT:   PERRL,normal conjunctiva. External ear and canals normal, TMs no opacification or erythema,  OP no erythema, no exudates, MMM  Neck:  No masses or LAD  Resp:  No wheezing, no rhonchi, no rales. CV:  RRR, normal S1S2, no murmur. GI: soft, nontender, limited by habitus  Extrem:   no edema, warm distally      Assessment/Plan:       ICD-10-CM ICD-9-CM    1. Routine medical exam  Z00.00 V70.0       2. Acute back pain, unspecified back location, unspecified back pain laterality  M54.9 724.5 methocarbamoL (ROBAXIN) 500 mg tablet      3. Essential hypertension  I10 401.9 losartan (COZAAR) 50 mg tablet      hydroCHLOROthiazide (HYDRODIURIL) 25 mg tablet      4. Pure hypercholesterolemia  E78.00 272.0       5. Prediabetes  R73.03 790.29       Continue to work on weight reduction. Monitor BP at home. Follow-up and Dispositions    Return in about 6 months (around 8/24/2023) for follow up on medications.          Darryl Bobby MD

## 2023-02-24 NOTE — PROGRESS NOTES
1. \"Have you been to the ER, urgent care clinic since your last visit? Hospitalized since your last visit? \" No    2. \"Have you seen or consulted any other health care providers outside of the [address] [place] [address] since your last visit? \" No     3. For patients aged 39-70: Has the patient had a colonoscopy / FIT/ Cologuard? Yes - Care Gap present. Most recent result on file      If the patient is female:    4. For patients aged 41-77: Has the patient had a mammogram within the past 2 years? Yes - Care Gap present. Most recent result on file      5. For patients aged 21-65: Has the patient had a pap smear? Yes - Care Gap present.  Most recent result on file

## 2023-03-09 ENCOUNTER — TELEPHONE (OUTPATIENT)
Dept: INTERNAL MEDICINE CLINIC | Age: 58
End: 2023-03-09

## 2023-03-09 NOTE — TELEPHONE ENCOUNTER
Patient states proving blood pressure readings as requested by provider      2/27  8:03 pm: 149/76  Following Deep breathing 8:06 pm: 139/79  2/28  5:12 am: 146/76  5:15 am: 149/80  3/1  5:17 am: 128/77  5:19 am: 127/77    States no symptoms associated with blood pressures      States will start collecting as 1 in am and 1 in pm and will keep record n/a

## 2023-03-15 ENCOUNTER — TELEPHONE (OUTPATIENT)
Dept: INTERNAL MEDICINE CLINIC | Age: 58
End: 2023-03-15

## 2023-03-15 NOTE — TELEPHONE ENCOUNTER
Pt states that she has a sinus infection with nasal drainage, facial pressure, headache, diarrhea and body aches. Pt has two negative COVID test.    Pt is scheduled for 3-17-23, but is asking if doctor can call something in to help with this. Please call pt to let her know what you can do.   Thanks

## 2023-03-16 RX ORDER — AZITHROMYCIN 250 MG/1
250 TABLET, FILM COATED ORAL SEE ADMIN INSTRUCTIONS
Qty: 6 TABLET | Refills: 0 | Status: SHIPPED | OUTPATIENT
Start: 2023-03-16 | End: 2023-03-21

## 2023-08-14 ENCOUNTER — TELEPHONE (OUTPATIENT)
Age: 58
End: 2023-08-14

## 2023-08-14 RX ORDER — AZITHROMYCIN 250 MG/1
250 TABLET, FILM COATED ORAL SEE ADMIN INSTRUCTIONS
Qty: 6 TABLET | Refills: 0 | Status: SHIPPED | OUTPATIENT
Start: 2023-08-14 | End: 2023-08-19

## 2023-08-14 NOTE — TELEPHONE ENCOUNTER
Patient states she has had a sore throat with sinus drainage with body aches 3 days. No fever. No Covid tested negative. Grandchild has been sick also. Patient feels like she has pressure build up as if it is a sinus infections.

## 2023-08-14 NOTE — TELEPHONE ENCOUNTER
Pt has sore throat with sinus drainage with body aches. Pt just tested NEGATIVE for COVID. Please call to schedule or advise. Please call pt as soon as you can. Was around Martín who has strep.

## 2024-02-05 ENCOUNTER — TELEPHONE (OUTPATIENT)
Age: 59
End: 2024-02-05

## 2024-02-05 DIAGNOSIS — E55.9 VITAMIN D DEFICIENCY: ICD-10-CM

## 2024-02-05 DIAGNOSIS — E11.9 CONTROLLED TYPE 2 DIABETES MELLITUS WITHOUT COMPLICATION, WITHOUT LONG-TERM CURRENT USE OF INSULIN (HCC): Primary | ICD-10-CM

## 2024-02-05 DIAGNOSIS — E78.00 PURE HYPERCHOLESTEROLEMIA: ICD-10-CM

## 2024-02-05 NOTE — TELEPHONE ENCOUNTER
----- Message from Candice Bryant sent at 2/5/2024  9:42 AM EST -----  Subject: Referral Request    Reason for referral request? Patient lost lab paper work for annual   physical scheduled for 6/7/2024. Please mail to home address.  Provider patient wants to be referred to(if known):     Provider Phone Number(if known):    Additional Information for Provider?   ---------------------------------------------------------------------------  --------------  CALL BACK INFO    [phone]; OK to leave message on voicemail  ---------------------------------------------------------------------------  --------------

## 2024-03-01 ENCOUNTER — TELEPHONE (OUTPATIENT)
Age: 59
End: 2024-03-01

## 2024-03-01 NOTE — TELEPHONE ENCOUNTER
Pt is having back spasms and is asking for muscle relaxer to be sent to pharmacy to help with this.    She hasn't had filled in some time    [facility] #[phone]    Please call pt to let her know what doctor will do.  Thanks.

## 2024-03-08 ENCOUNTER — OFFICE VISIT (OUTPATIENT)
Age: 59
End: 2024-03-08
Payer: COMMERCIAL

## 2024-03-08 VITALS
DIASTOLIC BLOOD PRESSURE: 78 MMHG | WEIGHT: 276 LBS | OXYGEN SATURATION: 97 % | HEART RATE: 97 BPM | RESPIRATION RATE: 16 BRPM | BODY MASS INDEX: 41.83 KG/M2 | HEIGHT: 68 IN | SYSTOLIC BLOOD PRESSURE: 152 MMHG | TEMPERATURE: 97.2 F

## 2024-03-08 DIAGNOSIS — E55.9 VITAMIN D DEFICIENCY: ICD-10-CM

## 2024-03-08 DIAGNOSIS — I10 PRIMARY HYPERTENSION: ICD-10-CM

## 2024-03-08 DIAGNOSIS — E78.00 PURE HYPERCHOLESTEROLEMIA: ICD-10-CM

## 2024-03-08 DIAGNOSIS — E11.9 CONTROLLED TYPE 2 DIABETES MELLITUS WITHOUT COMPLICATION, WITHOUT LONG-TERM CURRENT USE OF INSULIN (HCC): ICD-10-CM

## 2024-03-08 DIAGNOSIS — E66.01 CLASS 3 SEVERE OBESITY WITH SERIOUS COMORBIDITY AND BODY MASS INDEX (BMI) OF 40.0 TO 44.9 IN ADULT, UNSPECIFIED OBESITY TYPE (HCC): ICD-10-CM

## 2024-03-08 DIAGNOSIS — M25.561 PAIN IN BOTH KNEES, UNSPECIFIED CHRONICITY: ICD-10-CM

## 2024-03-08 DIAGNOSIS — M54.50 ACUTE BILATERAL LOW BACK PAIN WITHOUT SCIATICA: Primary | ICD-10-CM

## 2024-03-08 DIAGNOSIS — M25.562 PAIN IN BOTH KNEES, UNSPECIFIED CHRONICITY: ICD-10-CM

## 2024-03-08 LAB
25(OH)D3 SERPL-MCNC: 41.8 NG/ML (ref 30–100)
ALBUMIN SERPL-MCNC: 3.9 G/DL (ref 3.5–5)
ALBUMIN/GLOB SERPL: 1.1 (ref 1.1–2.2)
ALP SERPL-CCNC: 87 U/L (ref 45–117)
ALT SERPL-CCNC: 28 U/L (ref 12–78)
ANION GAP SERPL CALC-SCNC: 4 MMOL/L (ref 5–15)
AST SERPL-CCNC: 13 U/L (ref 15–37)
BASOPHILS # BLD: 0 K/UL (ref 0–0.1)
BASOPHILS NFR BLD: 1 % (ref 0–1)
BILIRUB SERPL-MCNC: 0.4 MG/DL (ref 0.2–1)
BUN SERPL-MCNC: 16 MG/DL (ref 6–20)
BUN/CREAT SERPL: 22 (ref 12–20)
CALCIUM SERPL-MCNC: 9.3 MG/DL (ref 8.5–10.1)
CHLORIDE SERPL-SCNC: 106 MMOL/L (ref 97–108)
CHOLEST SERPL-MCNC: 269 MG/DL
CO2 SERPL-SCNC: 30 MMOL/L (ref 21–32)
CREAT SERPL-MCNC: 0.72 MG/DL (ref 0.55–1.02)
CREAT UR-MCNC: 76.7 MG/DL
DIFFERENTIAL METHOD BLD: ABNORMAL
EOSINOPHIL # BLD: 0.2 K/UL (ref 0–0.4)
EOSINOPHIL NFR BLD: 3 % (ref 0–7)
ERYTHROCYTE [DISTWIDTH] IN BLOOD BY AUTOMATED COUNT: 15.5 % (ref 11.5–14.5)
EST. AVERAGE GLUCOSE BLD GHB EST-MCNC: 126 MG/DL
GLOBULIN SER CALC-MCNC: 3.7 G/DL (ref 2–4)
GLUCOSE SERPL-MCNC: 91 MG/DL (ref 65–100)
HBA1C MFR BLD: 6 % (ref 4–5.6)
HCT VFR BLD AUTO: 37 % (ref 35–47)
HDLC SERPL-MCNC: 78 MG/DL
HDLC SERPL: 3.4 (ref 0–5)
HGB BLD-MCNC: 11.9 G/DL (ref 11.5–16)
IMM GRANULOCYTES # BLD AUTO: 0 K/UL (ref 0–0.04)
IMM GRANULOCYTES NFR BLD AUTO: 0 % (ref 0–0.5)
LDLC SERPL CALC-MCNC: 174.8 MG/DL (ref 0–100)
LYMPHOCYTES # BLD: 3 K/UL (ref 0.8–3.5)
LYMPHOCYTES NFR BLD: 45 % (ref 12–49)
MCH RBC QN AUTO: 28.3 PG (ref 26–34)
MCHC RBC AUTO-ENTMCNC: 32.2 G/DL (ref 30–36.5)
MCV RBC AUTO: 88.1 FL (ref 80–99)
MICROALBUMIN UR-MCNC: <0.5 MG/DL
MICROALBUMIN/CREAT UR-RTO: <7 MG/G (ref 0–30)
MONOCYTES # BLD: 0.4 K/UL (ref 0–1)
MONOCYTES NFR BLD: 7 % (ref 5–13)
NEUTS SEG # BLD: 2.9 K/UL (ref 1.8–8)
NEUTS SEG NFR BLD: 44 % (ref 32–75)
NRBC # BLD: 0 K/UL (ref 0–0.01)
NRBC BLD-RTO: 0 PER 100 WBC
PLATELET # BLD AUTO: 285 K/UL (ref 150–400)
PMV BLD AUTO: 10.2 FL (ref 8.9–12.9)
POTASSIUM SERPL-SCNC: 3.6 MMOL/L (ref 3.5–5.1)
PROT SERPL-MCNC: 7.6 G/DL (ref 6.4–8.2)
RBC # BLD AUTO: 4.2 M/UL (ref 3.8–5.2)
SODIUM SERPL-SCNC: 140 MMOL/L (ref 136–145)
TRIGL SERPL-MCNC: 81 MG/DL
TSH SERPL DL<=0.05 MIU/L-ACNC: 1 UIU/ML (ref 0.36–3.74)
VLDLC SERPL CALC-MCNC: 16.2 MG/DL
WBC # BLD AUTO: 6.6 K/UL (ref 3.6–11)

## 2024-03-08 PROCEDURE — 3078F DIAST BP <80 MM HG: CPT | Performed by: FAMILY MEDICINE

## 2024-03-08 PROCEDURE — 99214 OFFICE O/P EST MOD 30 MIN: CPT | Performed by: FAMILY MEDICINE

## 2024-03-08 PROCEDURE — 3044F HG A1C LEVEL LT 7.0%: CPT | Performed by: FAMILY MEDICINE

## 2024-03-08 PROCEDURE — 3077F SYST BP >= 140 MM HG: CPT | Performed by: FAMILY MEDICINE

## 2024-03-08 RX ORDER — METHOCARBAMOL 500 MG/1
500 TABLET, FILM COATED ORAL 3 TIMES DAILY
Qty: 30 TABLET | Refills: 2 | Status: SHIPPED | OUTPATIENT
Start: 2024-03-08

## 2024-03-08 RX ORDER — LOSARTAN POTASSIUM AND HYDROCHLOROTHIAZIDE 25; 100 MG/1; MG/1
1 TABLET ORAL DAILY
Qty: 90 TABLET | Refills: 1 | Status: SHIPPED | OUTPATIENT
Start: 2024-03-08

## 2024-03-08 SDOH — ECONOMIC STABILITY: HOUSING INSECURITY
IN THE LAST 12 MONTHS, WAS THERE A TIME WHEN YOU DID NOT HAVE A STEADY PLACE TO SLEEP OR SLEPT IN A SHELTER (INCLUDING NOW)?: NO

## 2024-03-08 SDOH — ECONOMIC STABILITY: FOOD INSECURITY: WITHIN THE PAST 12 MONTHS, THE FOOD YOU BOUGHT JUST DIDN'T LAST AND YOU DIDN'T HAVE MONEY TO GET MORE.: NEVER TRUE

## 2024-03-08 SDOH — ECONOMIC STABILITY: FOOD INSECURITY: WITHIN THE PAST 12 MONTHS, YOU WORRIED THAT YOUR FOOD WOULD RUN OUT BEFORE YOU GOT MONEY TO BUY MORE.: NEVER TRUE

## 2024-03-08 SDOH — ECONOMIC STABILITY: INCOME INSECURITY: HOW HARD IS IT FOR YOU TO PAY FOR THE VERY BASICS LIKE FOOD, HOUSING, MEDICAL CARE, AND HEATING?: NOT HARD AT ALL

## 2024-03-08 ASSESSMENT — PATIENT HEALTH QUESTIONNAIRE - PHQ9
SUM OF ALL RESPONSES TO PHQ QUESTIONS 1-9: 2
SUM OF ALL RESPONSES TO PHQ QUESTIONS 1-9: 2
1. LITTLE INTEREST OR PLEASURE IN DOING THINGS: 1
SUM OF ALL RESPONSES TO PHQ QUESTIONS 1-9: 2
SUM OF ALL RESPONSES TO PHQ9 QUESTIONS 1 & 2: 2
SUM OF ALL RESPONSES TO PHQ QUESTIONS 1-9: 2
2. FEELING DOWN, DEPRESSED OR HOPELESS: 1

## 2024-03-08 NOTE — PROGRESS NOTES
\"Have you been to the ER, urgent care clinic since your last visit?  Hospitalized since your last visit?\"    NO    “Have you seen or consulted any other health care providers outside of [facility] since your last visit?”    NO        “Have you had a pap smear?”    Yes

## 2024-03-08 NOTE — PROGRESS NOTES
Agata Grossman is a 58 y.o. female who presents with lower back pain.  No radiating symptoms.  Will awaken with pain, better with heat and robaxin prn.  Has sedentary job.  Has bilateral knee pain. Left knee OA by xray 2023.        254# in , today 276#. On HCTZ 25mg daily and cozaar 50mg daily.  BP elevated today.  Not checking at home.          Past Medical History:   Diagnosis Date    HTN (hypertension) 2010    LAP-BAND surgery status 3/2006    band dislodged 2006 and not yet replaced      Pure hypercholesterolemia 2010    Type II or unspecified type diabetes mellitus without mention of complication, not stated as uncontrolled 2011    not currently on medication       Family History   Problem Relation Age of Onset    No Known Problems Sister     No Known Problems Mother     Hypertension Father     Dementia Father         Social History     Socioeconomic History    Marital status:      Spouse name: Not on file    Number of children: Not on file    Years of education: Not on file    Highest education level: Not on file   Occupational History    Not on file   Tobacco Use    Smoking status: Former     Current packs/day: 0.00     Types: Cigarettes     Quit date: 1995     Years since quittin.1    Smokeless tobacco: Never   Substance and Sexual Activity    Alcohol use: Yes    Drug use: No    Sexual activity: Not on file   Other Topics Concern    Not on file   Social History Narrative    Not on file     Social Determinants of Health     Financial Resource Strain: Low Risk  (3/8/2024)    Overall Financial Resource Strain (CARDIA)     Difficulty of Paying Living Expenses: Not hard at all   Food Insecurity: No Food Insecurity (3/8/2024)    Hunger Vital Sign     Worried About Running Out of Food in the Last Year: Never true     Ran Out of Food in the Last Year: Never true   Transportation Needs: Unknown (3/8/2024)    PRAPARE - Transportation     Lack of Transportation (Medical): Not on

## 2024-03-11 RX ORDER — ROSUVASTATIN CALCIUM 20 MG/1
20 TABLET, COATED ORAL DAILY
Qty: 90 TABLET | Refills: 1 | Status: SHIPPED | OUTPATIENT
Start: 2024-03-11

## 2024-03-11 NOTE — TELEPHONE ENCOUNTER
----- Message from Lorie Yuen sent at 3/11/2024 12:38 PM EDT -----  Subject: Message to Provider    QUESTIONS  Information for Provider? Patient said she is interested in cholesterol   Medication please send to her pharmacy on file   ---------------------------------------------------------------------------  --------------  CALL BACK INFO  [phone]; OK to leave message on voicemail  ---------------------------------------------------------------------------  --------------  SCRIPT ANSWERS  Relationship to Patient? Self

## 2024-05-05 RX ORDER — LOSARTAN POTASSIUM 50 MG/1
50 TABLET ORAL DAILY
Qty: 90 TABLET | Refills: 1 | Status: SHIPPED | OUTPATIENT
Start: 2024-05-05

## 2024-05-05 RX ORDER — HYDROCHLOROTHIAZIDE 25 MG/1
25 TABLET ORAL DAILY
Qty: 90 TABLET | Refills: 1 | Status: SHIPPED | OUTPATIENT
Start: 2024-05-05

## 2024-06-07 ENCOUNTER — OFFICE VISIT (OUTPATIENT)
Age: 59
End: 2024-06-07
Payer: COMMERCIAL

## 2024-06-07 VITALS
HEART RATE: 72 BPM | BODY MASS INDEX: 41.68 KG/M2 | SYSTOLIC BLOOD PRESSURE: 126 MMHG | RESPIRATION RATE: 16 BRPM | TEMPERATURE: 97 F | HEIGHT: 68 IN | OXYGEN SATURATION: 99 % | WEIGHT: 275 LBS | DIASTOLIC BLOOD PRESSURE: 75 MMHG

## 2024-06-07 DIAGNOSIS — E78.00 PURE HYPERCHOLESTEROLEMIA: ICD-10-CM

## 2024-06-07 DIAGNOSIS — E11.9 CONTROLLED TYPE 2 DIABETES MELLITUS WITHOUT COMPLICATION, WITHOUT LONG-TERM CURRENT USE OF INSULIN (HCC): ICD-10-CM

## 2024-06-07 DIAGNOSIS — I10 PRIMARY HYPERTENSION: ICD-10-CM

## 2024-06-07 DIAGNOSIS — E66.01 CLASS 3 SEVERE OBESITY WITH SERIOUS COMORBIDITY AND BODY MASS INDEX (BMI) OF 40.0 TO 44.9 IN ADULT, UNSPECIFIED OBESITY TYPE (HCC): ICD-10-CM

## 2024-06-07 DIAGNOSIS — Z00.00 ROUTINE MEDICAL EXAM: Primary | ICD-10-CM

## 2024-06-07 PROCEDURE — 3074F SYST BP LT 130 MM HG: CPT | Performed by: FAMILY MEDICINE

## 2024-06-07 PROCEDURE — 99396 PREV VISIT EST AGE 40-64: CPT | Performed by: FAMILY MEDICINE

## 2024-06-07 PROCEDURE — 3078F DIAST BP <80 MM HG: CPT | Performed by: FAMILY MEDICINE

## 2024-06-07 RX ORDER — PHENTERMINE HYDROCHLORIDE 37.5 MG/1
37.5 TABLET ORAL
Qty: 30 TABLET | Refills: 2 | Status: SHIPPED | OUTPATIENT
Start: 2024-06-07 | End: 2024-09-05

## 2024-06-07 NOTE — PROGRESS NOTES
\"Have you been to the ER, urgent care clinic since your last visit?  Hospitalized since your last visit?\"    NO    “Have you seen or consulted any other health care providers outside of [facility] since your last visit?”    NO     “Have you had a pap smear?”    NO    Date of last Cervical Cancer screen (HPV or PAP): 5/11/2018             Click Here for Release of Records Request  
Sexual Activity    Alcohol use: Yes    Drug use: No    Sexual activity: Not on file   Other Topics Concern    Not on file   Social History Narrative    Not on file     Social Determinants of Health     Financial Resource Strain: Low Risk  (3/8/2024)    Overall Financial Resource Strain (CARDIA)     Difficulty of Paying Living Expenses: Not hard at all   Food Insecurity: No Food Insecurity (3/8/2024)    Hunger Vital Sign     Worried About Running Out of Food in the Last Year: Never true     Ran Out of Food in the Last Year: Never true   Transportation Needs: Unknown (3/8/2024)    PRAPARE - Transportation     Lack of Transportation (Medical): Not on file     Lack of Transportation (Non-Medical): No   Physical Activity: Not on file   Stress: Not on file   Social Connections: Not on file   Intimate Partner Violence: Not on file   Housing Stability: Unknown (3/8/2024)    Housing Stability Vital Sign     Unable to Pay for Housing in the Last Year: Not on file     Number of Places Lived in the Last Year: Not on file     Unstable Housing in the Last Year: No        Current Outpatient Medications on File Prior to Visit   Medication Sig Dispense Refill    losartan (COZAAR) 50 MG tablet TAKE 1 TABLET BY MOUTH DAILY 90 tablet 1    hydroCHLOROthiazide (HYDRODIURIL) 25 MG tablet TAKE 1 TABLET BY MOUTH EVERY DAY 90 tablet 1    rosuvastatin (CRESTOR) 20 MG tablet Take 1 tablet by mouth daily 90 tablet 1    methocarbamol (ROBAXIN) 500 MG tablet Take 1 tablet by mouth 3 times daily As needed for muscle spasm 30 tablet 2    losartan-hydroCHLOROthiazide (HYZAAR) 100-25 MG per tablet Take 1 tablet by mouth daily 90 tablet 1    Cholecalciferol 50 MCG (2000 UT) TABS Take by mouth      glucosamine-chondroitin 500-400 MG CAPS Take 2 capsules by mouth daily      loratadine (CLARITIN) 10 MG tablet Take by mouth      furosemide (LASIX) 20 MG tablet Take by mouth daily (Patient not taking: Reported on 3/8/2024)      LORazepam (ATIVAN) 0.5 MG

## 2024-07-05 ENCOUNTER — NURSE ONLY (OUTPATIENT)
Age: 59
End: 2024-07-05

## 2024-07-05 DIAGNOSIS — E78.00 PURE HYPERCHOLESTEROLEMIA: ICD-10-CM

## 2024-07-05 LAB
ALBUMIN SERPL-MCNC: 3.9 G/DL (ref 3.5–5)
ALBUMIN/GLOB SERPL: 1.1 (ref 1.1–2.2)
ALP SERPL-CCNC: 81 U/L (ref 45–117)
ALT SERPL-CCNC: 25 U/L (ref 12–78)
AST SERPL-CCNC: 16 U/L (ref 15–37)
BILIRUB DIRECT SERPL-MCNC: <0.1 MG/DL (ref 0–0.2)
BILIRUB SERPL-MCNC: 0.3 MG/DL (ref 0.2–1)
CHOLEST SERPL-MCNC: 156 MG/DL
GLOBULIN SER CALC-MCNC: 3.4 G/DL (ref 2–4)
HDLC SERPL-MCNC: 71 MG/DL
HDLC SERPL: 2.2 (ref 0–5)
LDLC SERPL CALC-MCNC: 75.2 MG/DL (ref 0–100)
PROT SERPL-MCNC: 7.3 G/DL (ref 6.4–8.2)
TRIGL SERPL-MCNC: 49 MG/DL
VLDLC SERPL CALC-MCNC: 9.8 MG/DL

## 2024-08-13 ENCOUNTER — TELEMEDICINE (OUTPATIENT)
Age: 59
End: 2024-08-13
Payer: COMMERCIAL

## 2024-08-13 DIAGNOSIS — R11.0 NAUSEA: Primary | ICD-10-CM

## 2024-08-13 DIAGNOSIS — E66.01 CLASS 3 SEVERE OBESITY WITH SERIOUS COMORBIDITY AND BODY MASS INDEX (BMI) OF 40.0 TO 44.9 IN ADULT, UNSPECIFIED OBESITY TYPE (HCC): ICD-10-CM

## 2024-08-13 DIAGNOSIS — I10 PRIMARY HYPERTENSION: ICD-10-CM

## 2024-08-13 DIAGNOSIS — E11.9 CONTROLLED TYPE 2 DIABETES MELLITUS WITHOUT COMPLICATION, WITHOUT LONG-TERM CURRENT USE OF INSULIN (HCC): ICD-10-CM

## 2024-08-13 DIAGNOSIS — E78.00 PURE HYPERCHOLESTEROLEMIA: ICD-10-CM

## 2024-08-13 DIAGNOSIS — M17.0 PRIMARY OSTEOARTHRITIS OF BOTH KNEES: ICD-10-CM

## 2024-08-13 PROCEDURE — 3044F HG A1C LEVEL LT 7.0%: CPT | Performed by: FAMILY MEDICINE

## 2024-08-13 PROCEDURE — 99214 OFFICE O/P EST MOD 30 MIN: CPT | Performed by: FAMILY MEDICINE

## 2024-08-13 RX ORDER — PHENTERMINE HYDROCHLORIDE 37.5 MG/1
37.5 TABLET ORAL
Qty: 30 TABLET | Refills: 2 | Status: SHIPPED | OUTPATIENT
Start: 2024-08-13 | End: 2024-11-11

## 2024-08-13 SDOH — ECONOMIC STABILITY: FOOD INSECURITY: WITHIN THE PAST 12 MONTHS, YOU WORRIED THAT YOUR FOOD WOULD RUN OUT BEFORE YOU GOT MONEY TO BUY MORE.: NEVER TRUE

## 2024-08-13 SDOH — ECONOMIC STABILITY: FOOD INSECURITY: WITHIN THE PAST 12 MONTHS, THE FOOD YOU BOUGHT JUST DIDN'T LAST AND YOU DIDN'T HAVE MONEY TO GET MORE.: NEVER TRUE

## 2024-08-13 SDOH — ECONOMIC STABILITY: INCOME INSECURITY: HOW HARD IS IT FOR YOU TO PAY FOR THE VERY BASICS LIKE FOOD, HOUSING, MEDICAL CARE, AND HEATING?: NOT HARD AT ALL

## 2024-08-13 ASSESSMENT — PATIENT HEALTH QUESTIONNAIRE - PHQ9
SUM OF ALL RESPONSES TO PHQ QUESTIONS 1-9: 0
1. LITTLE INTEREST OR PLEASURE IN DOING THINGS: NOT AT ALL
SUM OF ALL RESPONSES TO PHQ QUESTIONS 1-9: 0
2. FEELING DOWN, DEPRESSED OR HOPELESS: NOT AT ALL
SUM OF ALL RESPONSES TO PHQ QUESTIONS 1-9: 0
SUM OF ALL RESPONSES TO PHQ9 QUESTIONS 1 & 2: 0
SUM OF ALL RESPONSES TO PHQ QUESTIONS 1-9: 0

## 2024-08-13 NOTE — PROGRESS NOTES
Patient identified with two identification factors, Name and Date of Birth.    Chief Complaint   Patient presents with    Diarrhea     Queezy stomach since 08/11/24. Also experiencing watery mouth. Pt stated since she went to the [employer] restaurant and had a cheese burger she has been experiencing these symptoms          8/13/2024    10:49 AM   Patient-Reported Vitals   Patient-Reported Weight 260.6lbs   Patient-Reported Height 5'8           1. \"Have you been to the ER, urgent care clinic since your last visit?  Hospitalized since your last visit?\" No     2. \"Have you seen or consulted any other health care providers outside of the [facility] since your last visit?\" No     
    No Known Problems Mother     Hypertension Father     Dementia Father         Social History     Socioeconomic History    Marital status:      Spouse name: Not on file    Number of children: Not on file    Years of education: Not on file    Highest education level: Not on file   Occupational History    Not on file   Tobacco Use    Smoking status: Former     Current packs/day: 0.00     Types: Cigarettes     Quit date: 1995     Years since quittin.5    Smokeless tobacco: Never   Vaping Use    Vaping status: Never Used   Substance and Sexual Activity    Alcohol use: Yes    Drug use: No    Sexual activity: Not on file   Other Topics Concern    Not on file   Social History Narrative    Not on file     Social Determinants of Health     Financial Resource Strain: Low Risk  (2024)    Overall Financial Resource Strain (CARDIA)     Difficulty of Paying Living Expenses: Not hard at all   Food Insecurity: No Food Insecurity (2024)    Hunger Vital Sign     Worried About Running Out of Food in the Last Year: Never true     Ran Out of Food in the Last Year: Never true   Transportation Needs: Unknown (2024)    PRAPARE - Transportation     Lack of Transportation (Medical): Not on file     Lack of Transportation (Non-Medical): No   Physical Activity: Not on file   Stress: Not on file   Social Connections: Not on file   Intimate Partner Violence: Not on file   Housing Stability: Unknown (2024)    Housing Stability Vital Sign     Unable to Pay for Housing in the Last Year: Not on file     Number of Times Moved in the Last Year: Not on file     Homeless in the Last Year: No        Current Outpatient Medications on File Prior to Visit   Medication Sig Dispense Refill    losartan (COZAAR) 50 MG tablet TAKE 1 TABLET BY MOUTH DAILY 90 tablet 1    hydroCHLOROthiazide (HYDRODIURIL) 25 MG tablet TAKE 1 TABLET BY MOUTH EVERY DAY 90 tablet 1    rosuvastatin (CRESTOR) 20 MG tablet Take 1 tablet by mouth

## 2024-09-10 DIAGNOSIS — I10 PRIMARY HYPERTENSION: ICD-10-CM

## 2024-09-12 RX ORDER — ROSUVASTATIN CALCIUM 20 MG/1
20 TABLET, COATED ORAL DAILY
Qty: 90 TABLET | Refills: 1 | Status: SHIPPED | OUTPATIENT
Start: 2024-09-12

## 2024-09-12 RX ORDER — LOSARTAN POTASSIUM AND HYDROCHLOROTHIAZIDE 25; 100 MG/1; MG/1
1 TABLET ORAL DAILY
Qty: 90 TABLET | Refills: 1 | Status: SHIPPED | OUTPATIENT
Start: 2024-09-12

## 2024-11-01 RX ORDER — HYDROCHLOROTHIAZIDE 25 MG/1
25 TABLET ORAL DAILY
Qty: 90 TABLET | Refills: 1 | Status: SHIPPED | OUTPATIENT
Start: 2024-11-01

## 2024-11-01 RX ORDER — LOSARTAN POTASSIUM 50 MG/1
50 TABLET ORAL DAILY
Qty: 90 TABLET | Refills: 1 | Status: SHIPPED | OUTPATIENT
Start: 2024-11-01

## 2024-11-11 ENCOUNTER — OFFICE VISIT (OUTPATIENT)
Age: 59
End: 2024-11-11
Payer: COMMERCIAL

## 2024-11-11 VITALS
WEIGHT: 261.2 LBS | HEIGHT: 68 IN | TEMPERATURE: 97.7 F | HEART RATE: 94 BPM | BODY MASS INDEX: 39.59 KG/M2 | OXYGEN SATURATION: 98 % | RESPIRATION RATE: 18 BRPM | SYSTOLIC BLOOD PRESSURE: 135 MMHG | DIASTOLIC BLOOD PRESSURE: 76 MMHG

## 2024-11-11 DIAGNOSIS — J01.10 ACUTE FRONTAL SINUSITIS, RECURRENCE NOT SPECIFIED: Primary | ICD-10-CM

## 2024-11-11 DIAGNOSIS — M25.562 LEFT KNEE PAIN, UNSPECIFIED CHRONICITY: ICD-10-CM

## 2024-11-11 PROCEDURE — 3078F DIAST BP <80 MM HG: CPT | Performed by: INTERNAL MEDICINE

## 2024-11-11 PROCEDURE — 99213 OFFICE O/P EST LOW 20 MIN: CPT | Performed by: INTERNAL MEDICINE

## 2024-11-11 PROCEDURE — 3075F SYST BP GE 130 - 139MM HG: CPT | Performed by: INTERNAL MEDICINE

## 2024-11-11 RX ORDER — AZITHROMYCIN 250 MG/1
250 TABLET, FILM COATED ORAL SEE ADMIN INSTRUCTIONS
Qty: 6 TABLET | Refills: 0 | Status: SHIPPED | OUTPATIENT
Start: 2024-11-11 | End: 2024-11-16

## 2024-11-11 ASSESSMENT — PATIENT HEALTH QUESTIONNAIRE - PHQ9
2. FEELING DOWN, DEPRESSED OR HOPELESS: NOT AT ALL
SUM OF ALL RESPONSES TO PHQ QUESTIONS 1-9: 0
SUM OF ALL RESPONSES TO PHQ QUESTIONS 1-9: 0
SUM OF ALL RESPONSES TO PHQ9 QUESTIONS 1 & 2: 0
1. LITTLE INTEREST OR PLEASURE IN DOING THINGS: NOT AT ALL
SUM OF ALL RESPONSES TO PHQ QUESTIONS 1-9: 0
SUM OF ALL RESPONSES TO PHQ QUESTIONS 1-9: 0

## 2024-11-11 NOTE — PROGRESS NOTES
PROGRESS NOTE  Name: Agata Grossman   : 1965       ASSESSMENT/ PLAN:     Agata was seen today for sinus problem, head congestion and leg pain.    Diagnoses and all orders for this visit:    Acute frontal sinusitis, recurrence not specified  -     azithromycin (ZITHROMAX) 250 MG tablet; Take 1 tablet by mouth See Admin Instructions for 5 days 500mg on day 1 followed by 250mg on days 2 - 5    Left knee pain, unspecified chronicity: Baker's cyst, likely related to arthritis.   -     External Referral To Orthopedic Surgery    Return if symptoms worsen or fail to improve.     I have reviewed the patient's medications and risks/side effects/benefits were discussed. Diagnosis(-es) explained to patient and questions answered. Literature provided where appropriate.                       SUBJECTIVE  Ms. Agata Grossman is a patient of Bettina Maddox MD and presents today acutely for     Chief Complaint   Patient presents with    Sinus Problem    Head Congestion    Leg Pain     LLL       She has had some sinus pressure, congestion, and \"tossing and turning, coughing, and headache.\" \"I get this every 2-3 years.\" She takes antihistamine and flonase (occasionally).     She has pain L leg, and swelling in popliteal area. She has a history of arthritis in L knee, and had Xray in  showing severe narrowing of medial joint space.     Current Outpatient Medications on File Prior to Visit   Medication Sig Dispense Refill    losartan-hydroCHLOROthiazide (HYZAAR) 100-25 MG per tablet TAKE 1 TABLET BY MOUTH DAILY 90 tablet 1    rosuvastatin (CRESTOR) 20 MG tablet TAKE 1 TABLET BY MOUTH DAILY 90 tablet 1    phentermine (ADIPEX-P) 37.5 MG tablet Take 1 tablet by mouth every morning (before breakfast) for 90 days. Max Daily Amount: 37.5 mg 30 tablet 2    methocarbamol (ROBAXIN) 500 MG tablet Take 1 tablet by mouth 3 times daily As needed for muscle spasm 30 tablet 2    Cholecalciferol 50 MCG ( UT) TABS Take by mouth

## 2024-11-12 NOTE — TELEPHONE ENCOUNTER
Pt states she has been waiting two days to hear from Dr. Carlos Clarke as she was told she would get a call back?  Please call show

## 2024-12-12 NOTE — PROGRESS NOTES
Agata Grossman is a 59 y.o. female who presents for follow up.     Treated for hypertension.  On HCTZ 25 mg daily and losartan 100 mg daily. BP controlled.       Diabetic.  HbA1c 6.0% 2024.  Negative microalbumin.     Reports working on weight loss, making diet changes.  Sedentary job. Less active due to left knee pain. Prior lap band procedure.  Not functional at this time.       2024.  Was on keto diet, off now.  On Crestor 20 mg daily in 2024,  LDL reduced from 175 to 75.  Reports trying to follow healthy diet.      Left knee OA.  No injection yet.  Reports knee does not hurt.  Taking mobic, some relief.  Has bakers cyst. Reports the pain is posterior left leg behind knee and hamstring.      Had constipation,  better with align.           Past Medical History:   Diagnosis Date    Baker's cyst of knee, left     HTN (hypertension) 2010    LAP-BAND surgery status 3/2006    band dislodged 2006 and not yet replaced      Pure hypercholesterolemia 2010    Type II or unspecified type diabetes mellitus without mention of complication, not stated as uncontrolled 2011    not currently on medication       Family History   Problem Relation Age of Onset    No Known Problems Sister     No Known Problems Mother     Hypertension Father     Dementia Father         Social History     Socioeconomic History    Marital status:      Spouse name: Not on file    Number of children: Not on file    Years of education: Not on file    Highest education level: Not on file   Occupational History    Not on file   Tobacco Use    Smoking status: Former     Current packs/day: 0.00     Types: Cigarettes     Quit date: 1995     Years since quittin.8    Smokeless tobacco: Never   Vaping Use    Vaping status: Never Used   Substance and Sexual Activity    Alcohol use: Yes    Drug use: No    Sexual activity: Not on file   Other Topics Concern    Not on file   Social History Narrative    Not on

## 2024-12-13 ENCOUNTER — OFFICE VISIT (OUTPATIENT)
Age: 59
End: 2024-12-13
Payer: COMMERCIAL

## 2024-12-13 VITALS
DIASTOLIC BLOOD PRESSURE: 73 MMHG | BODY MASS INDEX: 38.94 KG/M2 | OXYGEN SATURATION: 99 % | RESPIRATION RATE: 16 BRPM | SYSTOLIC BLOOD PRESSURE: 121 MMHG | TEMPERATURE: 97.9 F | HEIGHT: 68 IN | HEART RATE: 82 BPM | WEIGHT: 256.9 LBS

## 2024-12-13 DIAGNOSIS — E78.00 PURE HYPERCHOLESTEROLEMIA: Primary | ICD-10-CM

## 2024-12-13 DIAGNOSIS — E11.9 CONTROLLED TYPE 2 DIABETES MELLITUS WITHOUT COMPLICATION, WITHOUT LONG-TERM CURRENT USE OF INSULIN (HCC): ICD-10-CM

## 2024-12-13 DIAGNOSIS — E66.812 CLASS 2 SEVERE OBESITY WITH SERIOUS COMORBIDITY AND BODY MASS INDEX (BMI) OF 39.0 TO 39.9 IN ADULT, UNSPECIFIED OBESITY TYPE: ICD-10-CM

## 2024-12-13 DIAGNOSIS — E78.00 PURE HYPERCHOLESTEROLEMIA: ICD-10-CM

## 2024-12-13 DIAGNOSIS — E66.01 CLASS 2 SEVERE OBESITY WITH SERIOUS COMORBIDITY AND BODY MASS INDEX (BMI) OF 39.0 TO 39.9 IN ADULT, UNSPECIFIED OBESITY TYPE: ICD-10-CM

## 2024-12-13 DIAGNOSIS — M79.605 LEFT LEG PAIN: ICD-10-CM

## 2024-12-13 DIAGNOSIS — I10 PRIMARY HYPERTENSION: ICD-10-CM

## 2024-12-13 PROCEDURE — 99214 OFFICE O/P EST MOD 30 MIN: CPT | Performed by: FAMILY MEDICINE

## 2024-12-13 PROCEDURE — 3078F DIAST BP <80 MM HG: CPT | Performed by: FAMILY MEDICINE

## 2024-12-13 PROCEDURE — 3044F HG A1C LEVEL LT 7.0%: CPT | Performed by: FAMILY MEDICINE

## 2024-12-13 PROCEDURE — 3074F SYST BP LT 130 MM HG: CPT | Performed by: FAMILY MEDICINE

## 2024-12-13 RX ORDER — MELOXICAM 15 MG/1
15 TABLET ORAL DAILY
COMMUNITY
Start: 2024-11-13 | End: 2024-12-13

## 2024-12-13 RX ORDER — LIDOCAINE 50 MG/G
PATCH TOPICAL
COMMUNITY

## 2024-12-13 RX ORDER — MULTIVITAMIN
1 CAPSULE ORAL DAILY
COMMUNITY

## 2024-12-13 RX ORDER — PHENTERMINE HYDROCHLORIDE 37.5 MG/1
37.5 TABLET ORAL
Qty: 30 TABLET | Refills: 2 | Status: SHIPPED | OUTPATIENT
Start: 2024-12-13 | End: 2025-03-13

## 2024-12-13 RX ORDER — PHENTERMINE HYDROCHLORIDE 37.5 MG/1
TABLET ORAL
COMMUNITY
Start: 2024-09-11 | End: 2024-12-13 | Stop reason: SDUPTHER

## 2024-12-13 RX ORDER — DICLOFENAC SODIUM 75 MG/1
TABLET, DELAYED RELEASE ORAL
COMMUNITY
End: 2024-12-13

## 2024-12-13 ASSESSMENT — PATIENT HEALTH QUESTIONNAIRE - PHQ9
SUM OF ALL RESPONSES TO PHQ QUESTIONS 1-9: 0
SUM OF ALL RESPONSES TO PHQ QUESTIONS 1-9: 0
2. FEELING DOWN, DEPRESSED OR HOPELESS: NOT AT ALL
SUM OF ALL RESPONSES TO PHQ9 QUESTIONS 1 & 2: 0
1. LITTLE INTEREST OR PLEASURE IN DOING THINGS: NOT AT ALL
SUM OF ALL RESPONSES TO PHQ QUESTIONS 1-9: 0
SUM OF ALL RESPONSES TO PHQ QUESTIONS 1-9: 0

## 2024-12-13 NOTE — PROGRESS NOTES
\"Have you been to the ER, urgent care clinic since your last visit?  Hospitalized since your last visit?\"    NO    “Have you seen or consulted any other health care providers outside our system since your last visit?”    YES - When: approximately 2 months ago.  Where and Why: [facility].    Have you had a mammogram?”   NO    Date of last Mammogram: 8/1/2022      “Have you had a pap smear?”    NO    Date of last Cervical Cancer screen (HPV or PAP): 5/11/2018       “Have you had a diabetic eye exam?”    YES - Where: DR. Maxim Smith Nurse/CMA to request most recent records if not in the chart     No diabetic eye exam on file

## 2024-12-14 LAB
ALBUMIN SERPL-MCNC: 3.8 G/DL (ref 3.5–5)
ALBUMIN/GLOB SERPL: 1.2 (ref 1.1–2.2)
ALP SERPL-CCNC: 101 U/L (ref 45–117)
ALT SERPL-CCNC: 27 U/L (ref 12–78)
ANION GAP SERPL CALC-SCNC: 5 MMOL/L (ref 2–12)
AST SERPL-CCNC: 16 U/L (ref 15–37)
BILIRUB SERPL-MCNC: 0.2 MG/DL (ref 0.2–1)
BUN SERPL-MCNC: 21 MG/DL (ref 6–20)
BUN/CREAT SERPL: 34 (ref 12–20)
CALCIUM SERPL-MCNC: 9.5 MG/DL (ref 8.5–10.1)
CHLORIDE SERPL-SCNC: 104 MMOL/L (ref 97–108)
CHOLEST SERPL-MCNC: 159 MG/DL
CO2 SERPL-SCNC: 32 MMOL/L (ref 21–32)
CREAT SERPL-MCNC: 0.62 MG/DL (ref 0.55–1.02)
EST. AVERAGE GLUCOSE BLD GHB EST-MCNC: 134 MG/DL
GLOBULIN SER CALC-MCNC: 3.3 G/DL (ref 2–4)
GLUCOSE SERPL-MCNC: 92 MG/DL (ref 65–100)
HBA1C MFR BLD: 6.3 % (ref 4–5.6)
HDLC SERPL-MCNC: 70 MG/DL
HDLC SERPL: 2.3 (ref 0–5)
LDLC SERPL CALC-MCNC: 64.6 MG/DL (ref 0–100)
POTASSIUM SERPL-SCNC: 3.6 MMOL/L (ref 3.5–5.1)
PROT SERPL-MCNC: 7.1 G/DL (ref 6.4–8.2)
SODIUM SERPL-SCNC: 141 MMOL/L (ref 136–145)
TRIGL SERPL-MCNC: 122 MG/DL
VLDLC SERPL CALC-MCNC: 24.4 MG/DL

## 2025-03-21 DIAGNOSIS — I10 PRIMARY HYPERTENSION: ICD-10-CM

## 2025-03-22 RX ORDER — LOSARTAN POTASSIUM AND HYDROCHLOROTHIAZIDE 25; 100 MG/1; MG/1
1 TABLET ORAL DAILY
Qty: 90 TABLET | Refills: 1 | Status: SHIPPED | OUTPATIENT
Start: 2025-03-22

## 2025-03-22 RX ORDER — ROSUVASTATIN CALCIUM 20 MG/1
20 TABLET, COATED ORAL DAILY
Qty: 90 TABLET | Refills: 1 | Status: SHIPPED | OUTPATIENT
Start: 2025-03-22